# Patient Record
Sex: FEMALE | Race: WHITE | NOT HISPANIC OR LATINO | Employment: UNEMPLOYED | URBAN - NONMETROPOLITAN AREA
[De-identification: names, ages, dates, MRNs, and addresses within clinical notes are randomized per-mention and may not be internally consistent; named-entity substitution may affect disease eponyms.]

---

## 2018-05-10 ENCOUNTER — HISTORICAL (OUTPATIENT)
Dept: ADMINISTRATIVE | Facility: HOSPITAL | Age: 48
End: 2018-05-10

## 2018-06-20 LAB — CRC RECOMMENDATION EXT: NORMAL

## 2019-06-26 ENCOUNTER — HISTORICAL (OUTPATIENT)
Dept: ADMINISTRATIVE | Facility: HOSPITAL | Age: 49
End: 2019-06-26

## 2019-12-12 LAB — CRC RECOMMENDATION EXT: NORMAL

## 2020-02-13 LAB
INFLUENZA A ANTIGEN, POC: NEGATIVE
INFLUENZA B ANTIGEN, POC: NEGATIVE

## 2020-06-09 ENCOUNTER — HISTORICAL (OUTPATIENT)
Dept: ADMINISTRATIVE | Facility: HOSPITAL | Age: 50
End: 2020-06-09

## 2020-06-18 ENCOUNTER — HISTORICAL (OUTPATIENT)
Dept: SURGERY | Facility: HOSPITAL | Age: 50
End: 2020-06-18

## 2020-07-29 ENCOUNTER — HISTORICAL (OUTPATIENT)
Dept: ADMINISTRATIVE | Facility: HOSPITAL | Age: 50
End: 2020-07-29

## 2020-08-05 ENCOUNTER — HISTORICAL (OUTPATIENT)
Dept: ADMINISTRATIVE | Facility: HOSPITAL | Age: 50
End: 2020-08-05

## 2020-09-14 ENCOUNTER — HISTORICAL (OUTPATIENT)
Dept: ADMINISTRATIVE | Facility: HOSPITAL | Age: 50
End: 2020-09-14

## 2020-09-21 ENCOUNTER — HISTORICAL (OUTPATIENT)
Dept: ADMINISTRATIVE | Facility: HOSPITAL | Age: 50
End: 2020-09-21

## 2020-09-21 LAB
ALBUMIN SERPL-MCNC: 4.2 G/DL (ref 3.8–4.9)
ALBUMIN/GLOB SERPL: 1.8 {RATIO} (ref 1.2–2.2)
ALP SERPL-CCNC: 117 IU/L (ref 39–117)
ALT SERPL-CCNC: 20 IU/L (ref 0–32)
AST SERPL-CCNC: 18 IU/L (ref 0–40)
BILIRUB SERPL-MCNC: 0.2 MG/DL (ref 0–1.2)
BUN SERPL-MCNC: 14 MG/DL (ref 6–24)
CALCIUM SERPL-MCNC: 9.7 MG/DL (ref 8.7–10.2)
CHLORIDE SERPL-SCNC: 100 MMOL/L (ref 96–106)
CO2 SERPL-SCNC: 22 MMOL/L (ref 20–29)
CREAT SERPL-MCNC: 1.03 MG/DL (ref 0.57–1)
CREAT/UREA NIT SERPL: 14 (ref 9–23)
GLOBULIN SER-MCNC: 2.4 G/DL (ref 1.5–4.5)
GLUCOSE SERPL-MCNC: 107 MG/DL (ref 65–99)
HBA1C MFR BLD: 6 % (ref 4.8–5.6)
POTASSIUM SERPL-SCNC: 4.5 MMOL/L (ref 3.5–5.2)
PROT SERPL-MCNC: 6.6 G/DL (ref 6–8.5)
SODIUM SERPL-SCNC: 140 MMOL/L (ref 134–144)
TSH SERPL-ACNC: 2.37 MIU/ML (ref 0.45–4.5)

## 2021-06-23 LAB
ALBUMIN SERPL-MCNC: 4.2 G/DL (ref 3.4–5)
ALBUMIN/GLOB SERPL: 1.6 {RATIO}
ALP SERPL-CCNC: 73 U/L (ref 50–144)
ALT SERPL-CCNC: 34 U/L (ref 1–45)
ANION GAP SERPL CALC-SCNC: 8 MMOL/L (ref 7–16)
AST SERPL-CCNC: 34 U/L (ref 14–36)
BASOPHILS # BLD AUTO: 0.07 X10(3)/MCL (ref 0.01–0.08)
BASOPHILS NFR BLD AUTO: 1 % (ref 0.1–1.2)
BILIRUB SERPL-MCNC: 0.3 MG/DL (ref 0.1–1)
BUN SERPL-MCNC: 14 MG/DL (ref 7–20)
CALCIUM SERPL-MCNC: 9.6 MG/DL (ref 8.4–10.2)
CHLORIDE SERPL-SCNC: 105 MMOL/L (ref 94–110)
CHOLEST SERPL-MCNC: 186 MG/DL (ref 0–200)
CO2 SERPL-SCNC: 28 MMOL/L (ref 21–32)
CREAT SERPL-MCNC: 1.1 MG/DL (ref 0.52–1.04)
CREAT/UREA NIT SERPL: 12.7 (ref 12–20)
DEPRECATED CALCIDIOL+CALCIFEROL SERPL-MC: 57.7 NG/ML (ref 30–100)
EOSINOPHIL # BLD AUTO: 0.26 X10(3)/MCL (ref 0.04–0.36)
EOSINOPHIL NFR BLD AUTO: 3.6 % (ref 0.7–7)
ERYTHROCYTE [DISTWIDTH] IN BLOOD BY AUTOMATED COUNT: 12.8 % (ref 11–14.5)
EST. AVERAGE GLUCOSE BLD GHB EST-MCNC: 117 MG/DL (ref 70–115)
GLOBULIN SER-MCNC: 2.7 G/DL (ref 2–3.9)
GLUCOSE SERPL-MCNC: 109 MGM./DL (ref 70–115)
HBA1C MFR BLD: 5.9 % (ref 4–6)
HCT VFR BLD AUTO: 42.9 % (ref 36–48)
HDLC SERPL-MCNC: 38 MG/DL (ref 40–60)
HGB BLD-MCNC: 14.2 G/DL (ref 11.8–16)
IMM GRANULOCYTES # BLD AUTO: 0.04 X10E3/UL (ref 0–0.03)
IMM GRANULOCYTES NFR BLD AUTO: 0.6 % (ref 0–0.5)
LDLC SERPL CALC-MCNC: 117.6 MG/DL (ref 30–100)
LYMPHOCYTES # BLD AUTO: 2.5 X10(3)/MCL (ref 1.16–3.74)
LYMPHOCYTES NFR BLD AUTO: 34.9 % (ref 20–55)
MCH RBC QN AUTO: 29.2 PG (ref 27–34)
MCHC RBC AUTO-ENTMCNC: 33.1 G/DL (ref 31–37)
MCV RBC AUTO: 88.1 FL (ref 79–99)
MONOCYTES # BLD AUTO: 0.8 X10(3)/MCL (ref 0.24–0.36)
MONOCYTES NFR BLD AUTO: 11.2 % (ref 4.7–12.5)
NEUTROPHILS # BLD AUTO: 3.5 X10(3)/MCL (ref 1.56–6.13)
NEUTROPHILS NFR BLD AUTO: 48.7 % (ref 37–73)
PLATELET # BLD AUTO: 297 X10(3)/MCL (ref 140–371)
PMV BLD AUTO: 11.5 FL (ref 9.4–12.4)
POTASSIUM SERPL-SCNC: 4.3 MMOL/L (ref 3.5–5.1)
PROT SERPL-MCNC: 6.9 G/DL (ref 6.3–8.2)
RBC # BLD AUTO: 4.87 X10(6)/MCL (ref 4–5.1)
SODIUM SERPL-SCNC: 141 MMOL/L (ref 135–145)
TRIGL SERPL-MCNC: 150 MG/DL (ref 30–200)
TSH SERPL-ACNC: 1.07 UIU/ML (ref 0.36–3.74)
WBC # SPEC AUTO: 7.2 X10(3)/MCL (ref 4–11.5)

## 2021-07-06 ENCOUNTER — HISTORICAL (OUTPATIENT)
Dept: ADMINISTRATIVE | Facility: HOSPITAL | Age: 51
End: 2021-07-06

## 2021-07-08 ENCOUNTER — HISTORICAL (OUTPATIENT)
Dept: ADMINISTRATIVE | Facility: HOSPITAL | Age: 51
End: 2021-07-08

## 2021-07-08 LAB — SARS-COV-2 RNA RESP QL NAA+PROBE: NEGATIVE

## 2022-03-24 ENCOUNTER — HISTORICAL (OUTPATIENT)
Dept: ADMINISTRATIVE | Facility: HOSPITAL | Age: 52
End: 2022-03-24

## 2022-04-11 ENCOUNTER — HISTORICAL (OUTPATIENT)
Dept: ADMINISTRATIVE | Facility: HOSPITAL | Age: 52
End: 2022-04-11

## 2022-04-29 VITALS
BODY MASS INDEX: 32.35 KG/M2 | DIASTOLIC BLOOD PRESSURE: 72 MMHG | OXYGEN SATURATION: 98 % | HEIGHT: 63 IN | WEIGHT: 182.56 LBS | SYSTOLIC BLOOD PRESSURE: 126 MMHG

## 2022-05-08 ENCOUNTER — HISTORICAL (OUTPATIENT)
Dept: ADMINISTRATIVE | Facility: HOSPITAL | Age: 52
End: 2022-05-08

## 2022-09-22 ENCOUNTER — HISTORICAL (OUTPATIENT)
Dept: ADMINISTRATIVE | Facility: HOSPITAL | Age: 52
End: 2022-09-22

## 2023-01-25 ENCOUNTER — DOCUMENTATION ONLY (OUTPATIENT)
Dept: ADMINISTRATIVE | Facility: HOSPITAL | Age: 53
End: 2023-01-25
Payer: COMMERCIAL

## 2023-02-08 ENCOUNTER — OFFICE VISIT (OUTPATIENT)
Dept: FAMILY MEDICINE | Facility: CLINIC | Age: 53
End: 2023-02-08
Payer: COMMERCIAL

## 2023-02-08 VITALS
SYSTOLIC BLOOD PRESSURE: 128 MMHG | TEMPERATURE: 98 F | HEART RATE: 75 BPM | OXYGEN SATURATION: 98 % | DIASTOLIC BLOOD PRESSURE: 78 MMHG | WEIGHT: 170 LBS | HEIGHT: 62 IN | BODY MASS INDEX: 31.28 KG/M2

## 2023-02-08 DIAGNOSIS — F17.200 NEEDS SMOKING CESSATION EDUCATION: ICD-10-CM

## 2023-02-08 DIAGNOSIS — E78.2 MIXED HYPERLIPIDEMIA: ICD-10-CM

## 2023-02-08 DIAGNOSIS — F43.10 POSTTRAUMATIC STRESS DISORDER: ICD-10-CM

## 2023-02-08 DIAGNOSIS — R73.01 IMPAIRED FASTING GLUCOSE: ICD-10-CM

## 2023-02-08 DIAGNOSIS — J01.00 ACUTE NON-RECURRENT MAXILLARY SINUSITIS: Primary | ICD-10-CM

## 2023-02-08 DIAGNOSIS — F33.41 RECURRENT MAJOR DEPRESSIVE DISORDER, IN PARTIAL REMISSION: ICD-10-CM

## 2023-02-08 DIAGNOSIS — E03.9 ACQUIRED HYPOTHYROIDISM: ICD-10-CM

## 2023-02-08 DIAGNOSIS — K75.81 NONALCOHOLIC STEATOHEPATITIS (NASH): ICD-10-CM

## 2023-02-08 PROBLEM — J30.9 ALLERGIC RHINITIS: Status: ACTIVE | Noted: 2023-02-08

## 2023-02-08 PROBLEM — K58.9 IRRITABLE BOWEL SYNDROME: Status: ACTIVE | Noted: 2023-02-08

## 2023-02-08 PROBLEM — K21.9 GASTROESOPHAGEAL REFLUX DISEASE: Status: ACTIVE | Noted: 2023-02-08

## 2023-02-08 PROBLEM — J01.90 ACUTE SINUSITIS: Status: ACTIVE | Noted: 2023-02-08

## 2023-02-08 PROBLEM — M47.812 SPONDYLOSIS OF CERVICAL SPINE: Status: ACTIVE | Noted: 2023-02-08

## 2023-02-08 PROBLEM — F33.9 RECURRENT MAJOR DEPRESSIVE DISORDER: Status: ACTIVE | Noted: 2023-02-08

## 2023-02-08 PROBLEM — R91.1 LUNG NODULE: Status: ACTIVE | Noted: 2023-02-08

## 2023-02-08 PROCEDURE — 1160F PR REVIEW ALL MEDS BY PRESCRIBER/CLIN PHARMACIST DOCUMENTED: ICD-10-PCS | Mod: CPTII,,, | Performed by: FAMILY MEDICINE

## 2023-02-08 PROCEDURE — 96372 THER/PROPH/DIAG INJ SC/IM: CPT | Mod: ,,, | Performed by: FAMILY MEDICINE

## 2023-02-08 PROCEDURE — 1160F RVW MEDS BY RX/DR IN RCRD: CPT | Mod: CPTII,,, | Performed by: FAMILY MEDICINE

## 2023-02-08 PROCEDURE — 1159F PR MEDICATION LIST DOCUMENTED IN MEDICAL RECORD: ICD-10-PCS | Mod: CPTII,,, | Performed by: FAMILY MEDICINE

## 2023-02-08 PROCEDURE — 99214 OFFICE O/P EST MOD 30 MIN: CPT | Mod: 25,,, | Performed by: FAMILY MEDICINE

## 2023-02-08 PROCEDURE — 3074F SYST BP LT 130 MM HG: CPT | Mod: CPTII,,, | Performed by: FAMILY MEDICINE

## 2023-02-08 PROCEDURE — 1159F MED LIST DOCD IN RCRD: CPT | Mod: CPTII,,, | Performed by: FAMILY MEDICINE

## 2023-02-08 PROCEDURE — 3078F DIAST BP <80 MM HG: CPT | Mod: CPTII,,, | Performed by: FAMILY MEDICINE

## 2023-02-08 PROCEDURE — 3074F PR MOST RECENT SYSTOLIC BLOOD PRESSURE < 130 MM HG: ICD-10-PCS | Mod: CPTII,,, | Performed by: FAMILY MEDICINE

## 2023-02-08 PROCEDURE — 3008F BODY MASS INDEX DOCD: CPT | Mod: CPTII,,, | Performed by: FAMILY MEDICINE

## 2023-02-08 PROCEDURE — 3078F PR MOST RECENT DIASTOLIC BLOOD PRESSURE < 80 MM HG: ICD-10-PCS | Mod: CPTII,,, | Performed by: FAMILY MEDICINE

## 2023-02-08 PROCEDURE — 3008F PR BODY MASS INDEX (BMI) DOCUMENTED: ICD-10-PCS | Mod: CPTII,,, | Performed by: FAMILY MEDICINE

## 2023-02-08 PROCEDURE — 96372 PR INJECTION,THERAP/PROPH/DIAG2ST, IM OR SUBCUT: ICD-10-PCS | Mod: ,,, | Performed by: FAMILY MEDICINE

## 2023-02-08 PROCEDURE — 99214 PR OFFICE/OUTPT VISIT, EST, LEVL IV, 30-39 MIN: ICD-10-PCS | Mod: 25,,, | Performed by: FAMILY MEDICINE

## 2023-02-08 RX ORDER — FENOFIBRATE 160 MG/1
160 TABLET ORAL
COMMUNITY
Start: 2022-07-14 | End: 2023-03-21 | Stop reason: SDUPTHER

## 2023-02-08 RX ORDER — AZITHROMYCIN 250 MG/1
TABLET, FILM COATED ORAL
Qty: 6 TABLET | Refills: 0 | Status: SHIPPED | OUTPATIENT
Start: 2023-02-08 | End: 2023-02-13

## 2023-02-08 RX ORDER — GLUCOSAMINE HCL 500 MG
TABLET ORAL
COMMUNITY

## 2023-02-08 RX ORDER — ESCITALOPRAM OXALATE 10 MG/1
10 TABLET ORAL
COMMUNITY
Start: 2022-11-08 | End: 2023-03-21 | Stop reason: SDUPTHER

## 2023-02-08 RX ORDER — ONDANSETRON 4 MG/1
4 TABLET, ORALLY DISINTEGRATING ORAL EVERY 6 HOURS PRN
COMMUNITY
Start: 2022-08-18 | End: 2023-03-01

## 2023-02-08 RX ORDER — LEVOTHYROXINE SODIUM 88 UG/1
88 TABLET ORAL
COMMUNITY
Start: 2022-07-14 | End: 2023-03-21 | Stop reason: SDUPTHER

## 2023-02-08 RX ORDER — VILAZODONE HYDROCHLORIDE 20 MG/1
20 TABLET ORAL
COMMUNITY
Start: 2022-11-23 | End: 2023-03-21 | Stop reason: SDUPTHER

## 2023-02-08 RX ORDER — BETAMETHASONE SODIUM PHOSPHATE AND BETAMETHASONE ACETATE 3; 3 MG/ML; MG/ML
9 INJECTION, SUSPENSION INTRA-ARTICULAR; INTRALESIONAL; INTRAMUSCULAR; SOFT TISSUE
Status: COMPLETED | OUTPATIENT
Start: 2023-02-08 | End: 2023-02-08

## 2023-02-08 RX ADMIN — BETAMETHASONE SODIUM PHOSPHATE AND BETAMETHASONE ACETATE 9 MG: 3; 3 INJECTION, SUSPENSION INTRA-ARTICULAR; INTRALESIONAL; INTRAMUSCULAR; SOFT TISSUE at 05:02

## 2023-02-08 NOTE — PROGRESS NOTES
"SUBJECTIVE:  HPI    Marj Marcus is a 52 y.o. female here for Follow-up (Has sinus congestion).  She reports a 5 day history of nasal congestion, facial pain and pressure sore throat.  No chills.    She is been doing well from her depression standpoint.  She has to take escitalopram and Viibryd.  She denies any medication side effects.  Her social stressors have improved.    She denies any chest pain or shortness of breath.      Marj's allergies, medications, history, and problem list were updated as appropriate.    ROS:  Pertinent ROS as above, otherwise negative    OBJECTIVE:  Vital signs  Visit Vitals  /78 (BP Location: Right arm, Patient Position: Sitting, BP Method: Medium (Manual))   Pulse 75   Temp 97.5 °F (36.4 °C)   Ht 5' 2.4" (1.585 m)   Wt 77.1 kg (169 lb 15.6 oz)   SpO2 98%   BMI 30.69 kg/m²          PHYSICAL EXAM:  General:  Awake, alert, no acute distress   Eyes:  Pupils equal, round, reactive to light.  Conjunctiva normal bilaterally.  Ears:  Bilateral external auditory canals normal.  Bilateral tympanic membranes normal.  Nares:  Bilateral turbinate erythema and edema with clear rhinorrhea.  Maxillary sinus tenderness to palpation.  Oropharynx:  Postnasal drainage present.  No erythema or exudate.  Neck:  No lymphadenopathy  Cardiovascular: Regular rate and rhythm.  No murmurs.  Respiratory: Clear to auscultation bilaterally, normal effort      January 11, 2023:  Glucose 105, hemoglobin A1c 5.9, BUN 24, creatinine 1.20, GFR 53, TSH 1.87, AST 21, ALT 19, total cholesterol 162, triglycerides 52, HDL 48, , hemoglobin 14, hematocrit 42, white blood cell count 6.59, platelet count 297      ASSESSMENT/PLAN:  1. Acute non-recurrent maxillary sinusitis  Assessment & Plan:  Celestone 9 mg IM   Z-Dolores    Orders:  -     azithromycin (Z-DOLORES) 250 MG tablet; Take 2 tablets (500 mg total) by mouth once daily for 1 day, THEN 1 tablet (250 mg total) once daily for 4 days.  Dispense: 6 tablet; Refill: 0  - "     betamethasone acetate-betamethasone sodium phosphate injection 9 mg    2. Mixed hyperlipidemia  Assessment & Plan:  Stable on fenofibrate 160 mg a day    Orders:  -     Lipid Panel; Future; Expected date: 08/08/2023    3. Acquired hypothyroidism  Assessment & Plan:  Stable on levothyroxine 88 mcg a day    Orders:  -     CBC Auto Differential; Future; Expected date: 08/08/2023  -     TSH; Future; Expected date: 08/08/2023    4. Impaired fasting glucose  -     Hemoglobin A1C; Future; Expected date: 08/08/2023    5. Nonalcoholic steatohepatitis (GUTIÉRREZ)  -     Comprehensive Metabolic Panel; Future; Expected date: 08/08/2023    6. Recurrent major depressive disorder, in partial remission  Assessment & Plan:  Controlled on Viibryd 20 mg a day and escitalopram 10 mg a day      7. Posttraumatic stress disorder            Follow Up:  Follow up in about 6 months (around 8/8/2023) for Fasting labs, Chronic medical follow-up.

## 2023-03-01 ENCOUNTER — OFFICE VISIT (OUTPATIENT)
Dept: FAMILY MEDICINE | Facility: CLINIC | Age: 53
End: 2023-03-01
Payer: COMMERCIAL

## 2023-03-01 VITALS
TEMPERATURE: 100 F | WEIGHT: 169.38 LBS | BODY MASS INDEX: 30.01 KG/M2 | HEIGHT: 63 IN | HEART RATE: 86 BPM | DIASTOLIC BLOOD PRESSURE: 72 MMHG | SYSTOLIC BLOOD PRESSURE: 100 MMHG | OXYGEN SATURATION: 96 %

## 2023-03-01 DIAGNOSIS — J20.9 ACUTE BRONCHITIS, UNSPECIFIED ORGANISM: ICD-10-CM

## 2023-03-01 DIAGNOSIS — J01.91 ACUTE RECURRENT SINUSITIS, UNSPECIFIED LOCATION: Primary | ICD-10-CM

## 2023-03-01 PROCEDURE — 3008F BODY MASS INDEX DOCD: CPT | Mod: CPTII,,, | Performed by: FAMILY MEDICINE

## 2023-03-01 PROCEDURE — 3074F SYST BP LT 130 MM HG: CPT | Mod: CPTII,,, | Performed by: FAMILY MEDICINE

## 2023-03-01 PROCEDURE — 1160F PR REVIEW ALL MEDS BY PRESCRIBER/CLIN PHARMACIST DOCUMENTED: ICD-10-PCS | Mod: CPTII,,, | Performed by: FAMILY MEDICINE

## 2023-03-01 PROCEDURE — 1160F RVW MEDS BY RX/DR IN RCRD: CPT | Mod: CPTII,,, | Performed by: FAMILY MEDICINE

## 2023-03-01 PROCEDURE — 3008F PR BODY MASS INDEX (BMI) DOCUMENTED: ICD-10-PCS | Mod: CPTII,,, | Performed by: FAMILY MEDICINE

## 2023-03-01 PROCEDURE — 3078F DIAST BP <80 MM HG: CPT | Mod: CPTII,,, | Performed by: FAMILY MEDICINE

## 2023-03-01 PROCEDURE — 1159F MED LIST DOCD IN RCRD: CPT | Mod: CPTII,,, | Performed by: FAMILY MEDICINE

## 2023-03-01 PROCEDURE — 99214 PR OFFICE/OUTPT VISIT, EST, LEVL IV, 30-39 MIN: ICD-10-PCS | Mod: ,,, | Performed by: FAMILY MEDICINE

## 2023-03-01 PROCEDURE — 99214 OFFICE O/P EST MOD 30 MIN: CPT | Mod: ,,, | Performed by: FAMILY MEDICINE

## 2023-03-01 PROCEDURE — 3078F PR MOST RECENT DIASTOLIC BLOOD PRESSURE < 80 MM HG: ICD-10-PCS | Mod: CPTII,,, | Performed by: FAMILY MEDICINE

## 2023-03-01 PROCEDURE — 3074F PR MOST RECENT SYSTOLIC BLOOD PRESSURE < 130 MM HG: ICD-10-PCS | Mod: CPTII,,, | Performed by: FAMILY MEDICINE

## 2023-03-01 PROCEDURE — 1159F PR MEDICATION LIST DOCUMENTED IN MEDICAL RECORD: ICD-10-PCS | Mod: CPTII,,, | Performed by: FAMILY MEDICINE

## 2023-03-01 RX ORDER — CEFDINIR 300 MG/1
300 CAPSULE ORAL 2 TIMES DAILY
Qty: 20 CAPSULE | Refills: 0 | Status: SHIPPED | OUTPATIENT
Start: 2023-03-01 | End: 2023-03-11

## 2023-03-01 RX ORDER — ALBUTEROL SULFATE 90 UG/1
2 AEROSOL, METERED RESPIRATORY (INHALATION) EVERY 4 HOURS PRN
Qty: 18 G | Refills: 0 | Status: SHIPPED | OUTPATIENT
Start: 2023-03-01 | End: 2023-04-20 | Stop reason: ALTCHOICE

## 2023-03-01 RX ORDER — PREDNISONE 10 MG/1
40 TABLET ORAL DAILY
Qty: 28 TABLET | Refills: 0 | Status: SHIPPED | OUTPATIENT
Start: 2023-03-01 | End: 2023-03-08

## 2023-03-01 NOTE — PROGRESS NOTES
"SUBJECTIVE:  HPI    Marj Marcus is a 52 y.o. female here for Cough, Nasal Congestion, Fever, and Fatigue.  She reports a greater than 7 day history of nasal congestion, purulent rhinorrhea, increased cough and chest congestion associated with wheezing.  She has run fever for the last 3 days to 102°.  She was previously seen on February 8th and diagnosed with sinusitis and treated with azithromycin.  Her symptoms improved for only 4 days.  She does continue to smoke.      Marj's allergies, medications, history, and problem list were updated as appropriate.  Reviewed last office note dated February 8, 2023.    ROS:  Pertinent ROS as above, otherwise negative    OBJECTIVE:  Vital signs  Visit Vitals  /72 (BP Location: Left arm, Patient Position: Sitting, BP Method: Medium (Manual))   Pulse 86   Temp 99.6 °F (37.6 °C) (Oral)   Ht 5' 2.6" (1.59 m)   Wt 76.8 kg (169 lb 6.4 oz)   LMP  (LMP Unknown)   SpO2 96%   BMI 30.39 kg/m²          PHYSICAL EXAM:  General:  Awake, alert, no acute distress   Eyes:  Pupils equal, round, reactive to light.  Conjunctiva normal bilaterally.  Ears:  Bilateral external auditory canals normal.  Tympanic membrane normal on the right and erythematous on the left  Nares:  Bilateral turbinate erythema and edema with clear rhinorrhea.  Oropharynx:  Postnasal drainage present.  No erythema or exudate.  Cardiovascular: Regular rate and rhythm.  No murmurs.  Respiratory: Clear to auscultation bilaterally, normal effort    ASSESSMENT/PLAN:  1. Acute recurrent sinusitis, unspecified location  -     cefdinir (OMNICEF) 300 MG capsule; Take 1 capsule (300 mg total) by mouth 2 (two) times daily. for 10 days  Dispense: 20 capsule; Refill: 0    2. Acute bronchitis, unspecified organism  -     predniSONE (DELTASONE) 10 MG tablet; Take 4 tablets (40 mg total) by mouth once daily. for 7 days  Dispense: 28 tablet; Refill: 0  -     albuterol (VENTOLIN HFA) 90 mcg/actuation inhaler; Inhale 2 puffs into the " lungs every 4 (four) hours as needed for Wheezing (cough/wheezing/shortness of breath). Rescue  Dispense: 18 g; Refill: 0            Follow Up:  Follow up if symptoms worsen or fail to improve.

## 2023-03-10 ENCOUNTER — CLINICAL SUPPORT (OUTPATIENT)
Dept: SMOKING CESSATION | Facility: CLINIC | Age: 53
End: 2023-03-10
Payer: COMMERCIAL

## 2023-03-10 DIAGNOSIS — F17.200 NICOTINE DEPENDENCE: Primary | ICD-10-CM

## 2023-03-10 PROCEDURE — 99404 PR PREVENT COUNSEL,INDIV,60 MIN: ICD-10-PCS | Mod: S$GLB,,, | Performed by: GENERAL PRACTICE

## 2023-03-10 PROCEDURE — 99404 PREV MED CNSL INDIV APPRX 60: CPT | Mod: S$GLB,,, | Performed by: GENERAL PRACTICE

## 2023-03-10 RX ORDER — IBUPROFEN 200 MG
1 TABLET ORAL DAILY
Qty: 14 PATCH | Refills: 1 | Status: SHIPPED | OUTPATIENT
Start: 2023-03-10 | End: 2024-02-22 | Stop reason: ALTCHOICE

## 2023-03-10 RX ORDER — DM/P-EPHED/ACETAMINOPH/DOXYLAM 30-7.5/3
2 LIQUID (ML) ORAL
Qty: 300 LOZENGE | Refills: 0 | Status: SHIPPED | OUTPATIENT
Start: 2023-03-10 | End: 2024-02-22 | Stop reason: ALTCHOICE

## 2023-03-14 ENCOUNTER — TELEPHONE (OUTPATIENT)
Dept: SMOKING CESSATION | Facility: CLINIC | Age: 53
End: 2023-03-14
Payer: COMMERCIAL

## 2023-03-20 ENCOUNTER — TELEPHONE (OUTPATIENT)
Dept: SMOKING CESSATION | Facility: CLINIC | Age: 53
End: 2023-03-20
Payer: COMMERCIAL

## 2023-03-20 NOTE — TELEPHONE ENCOUNTER
Attempted to contact patient to return phone call after receiving a request to call patient back regarding prescribed NRTs.  Counselor left as voice message giving explanation why prescription has not been filled and instructions on how to complete process to get it filled and mailed out.

## 2023-03-21 ENCOUNTER — TELEPHONE (OUTPATIENT)
Dept: FAMILY MEDICINE | Facility: CLINIC | Age: 53
End: 2023-03-21
Payer: COMMERCIAL

## 2023-03-21 ENCOUNTER — CLINICAL SUPPORT (OUTPATIENT)
Dept: SMOKING CESSATION | Facility: CLINIC | Age: 53
End: 2023-03-21

## 2023-03-21 DIAGNOSIS — F43.10 POSTTRAUMATIC STRESS DISORDER: ICD-10-CM

## 2023-03-21 DIAGNOSIS — E03.9 ACQUIRED HYPOTHYROIDISM: ICD-10-CM

## 2023-03-21 DIAGNOSIS — K75.81 NONALCOHOLIC STEATOHEPATITIS (NASH): ICD-10-CM

## 2023-03-21 DIAGNOSIS — F17.200 NICOTINE DEPENDENCE: Primary | ICD-10-CM

## 2023-03-21 DIAGNOSIS — F33.41 RECURRENT MAJOR DEPRESSIVE DISORDER, IN PARTIAL REMISSION: Primary | ICD-10-CM

## 2023-03-21 PROCEDURE — 99402 PR PREVENT COUNSEL,INDIV,30 MIN: ICD-10-PCS | Mod: S$GLB,,, | Performed by: GENERAL PRACTICE

## 2023-03-21 PROCEDURE — 99402 PREV MED CNSL INDIV APPRX 30: CPT | Mod: S$GLB,,, | Performed by: GENERAL PRACTICE

## 2023-03-21 RX ORDER — ESCITALOPRAM OXALATE 10 MG/1
10 TABLET ORAL DAILY
Qty: 90 TABLET | Refills: 1 | Status: SHIPPED | OUTPATIENT
Start: 2023-03-21 | End: 2023-11-09 | Stop reason: SDUPTHER

## 2023-03-21 RX ORDER — VILAZODONE HYDROCHLORIDE 20 MG/1
20 TABLET ORAL DAILY
Qty: 90 TABLET | Refills: 1 | Status: SHIPPED | OUTPATIENT
Start: 2023-03-21 | End: 2024-02-22 | Stop reason: SDUPTHER

## 2023-03-21 RX ORDER — FENOFIBRATE 160 MG/1
160 TABLET ORAL DAILY
Qty: 90 TABLET | Refills: 1 | Status: SHIPPED | OUTPATIENT
Start: 2023-03-21 | End: 2023-11-20 | Stop reason: SDUPTHER

## 2023-03-21 RX ORDER — LEVOTHYROXINE SODIUM 88 UG/1
88 TABLET ORAL
Qty: 90 TABLET | Refills: 1 | Status: SHIPPED | OUTPATIENT
Start: 2023-03-21 | End: 2023-09-24 | Stop reason: SDUPTHER

## 2023-03-21 NOTE — PROGRESS NOTES
Individual Follow-Up Form    3/21/2023    Quit Date: TBD    Clinical Status of Patient: Outpatient    Length of Service: 30 minutes    Continuing Medication: yes  Patches    Other Medications: Nicotine Lozenges     Target Symptoms: Withdrawal and medication side effects. The following were  rated moderate (3) to severe (4) on TCRS:  Moderate (3): None Reported  Severe (4): None     Comments: Spoke with patient at length via phone regarding tobacco cessation progress. Patient will be receive her prescribed tobacco cessation medications 14mg nicotine patch and 2mg nicotine lozenges via mail from [facility] .  Patient remains smoking 5-6 cigarettes a day.   Patient will cut down to 4 cigarettes a day this week.  Reviewed strategies, cues, and triggers.  Patient stated that during her trip, she limited the number of cigarettes she smoked due to environmental restrictions against smoking.  Counselor suggested that patient utilize jolly ranchers, dum dum lollipops, and toffee candy to help control nicotine cravings.  Patient appeared to be receptive to the information given.  Counselor will continue to motivate patient to be tobacco free.    Diagnosis: F17.200    Next Visit: 1 week

## 2023-03-28 NOTE — TELEPHONE ENCOUNTER
"Subjective   Patient ID: Jerel Maddox is a 8 y.o. female who presents for URI (Patient claims beginning of march she has a lingering cough last night it got worse today she claims she had ear pain yesterday.tylenol makes it feel ok.).    HPI     Review of Systems    Objective   BP 90/60 (Patient Position: Sitting, BP Cuff Size: Child)   Pulse 101   Resp 16   Ht 1.372 m (4' 6\")   Wt 33 kg   SpO2 99%   BMI 17.55 kg/m²     Physical Exam    Assessment/Plan          " Attempted to contact patient to respond to a call back request from patient regarding her NRT prescription.  Patient did not answer.  Counselor left a voice message with explanation why prescription has not been processed and shipped out.  Counselor also left information regarding instructions on how to complete the process and the phone number to the pharmacy.

## 2023-04-20 ENCOUNTER — OFFICE VISIT (OUTPATIENT)
Dept: FAMILY MEDICINE | Facility: CLINIC | Age: 53
End: 2023-04-20
Payer: COMMERCIAL

## 2023-04-20 VITALS
TEMPERATURE: 98 F | DIASTOLIC BLOOD PRESSURE: 68 MMHG | HEART RATE: 80 BPM | OXYGEN SATURATION: 99 % | HEIGHT: 63 IN | SYSTOLIC BLOOD PRESSURE: 112 MMHG | BODY MASS INDEX: 30.12 KG/M2 | WEIGHT: 170 LBS

## 2023-04-20 DIAGNOSIS — E03.9 ACQUIRED HYPOTHYROIDISM: ICD-10-CM

## 2023-04-20 DIAGNOSIS — R00.2 PALPITATIONS: Primary | ICD-10-CM

## 2023-04-20 LAB — TSH SERPL-ACNC: 1.28 UIU/ML (ref 0.36–3.74)

## 2023-04-20 PROCEDURE — 3008F PR BODY MASS INDEX (BMI) DOCUMENTED: ICD-10-PCS | Mod: CPTII,,, | Performed by: FAMILY MEDICINE

## 2023-04-20 PROCEDURE — 1160F PR REVIEW ALL MEDS BY PRESCRIBER/CLIN PHARMACIST DOCUMENTED: ICD-10-PCS | Mod: CPTII,,, | Performed by: FAMILY MEDICINE

## 2023-04-20 PROCEDURE — 99214 OFFICE O/P EST MOD 30 MIN: CPT | Mod: ,,, | Performed by: FAMILY MEDICINE

## 2023-04-20 PROCEDURE — 3074F PR MOST RECENT SYSTOLIC BLOOD PRESSURE < 130 MM HG: ICD-10-PCS | Mod: CPTII,,, | Performed by: FAMILY MEDICINE

## 2023-04-20 PROCEDURE — 1159F PR MEDICATION LIST DOCUMENTED IN MEDICAL RECORD: ICD-10-PCS | Mod: CPTII,,, | Performed by: FAMILY MEDICINE

## 2023-04-20 PROCEDURE — 3074F SYST BP LT 130 MM HG: CPT | Mod: CPTII,,, | Performed by: FAMILY MEDICINE

## 2023-04-20 PROCEDURE — 3078F DIAST BP <80 MM HG: CPT | Mod: CPTII,,, | Performed by: FAMILY MEDICINE

## 2023-04-20 PROCEDURE — 99214 PR OFFICE/OUTPT VISIT, EST, LEVL IV, 30-39 MIN: ICD-10-PCS | Mod: ,,, | Performed by: FAMILY MEDICINE

## 2023-04-20 PROCEDURE — 3008F BODY MASS INDEX DOCD: CPT | Mod: CPTII,,, | Performed by: FAMILY MEDICINE

## 2023-04-20 PROCEDURE — 84443 ASSAY THYROID STIM HORMONE: CPT | Performed by: FAMILY MEDICINE

## 2023-04-20 PROCEDURE — 1160F RVW MEDS BY RX/DR IN RCRD: CPT | Mod: CPTII,,, | Performed by: FAMILY MEDICINE

## 2023-04-20 PROCEDURE — 3078F PR MOST RECENT DIASTOLIC BLOOD PRESSURE < 80 MM HG: ICD-10-PCS | Mod: CPTII,,, | Performed by: FAMILY MEDICINE

## 2023-04-20 PROCEDURE — 93000 PR ELECTROCARDIOGRAM, COMPLETE: ICD-10-PCS | Mod: ,,, | Performed by: FAMILY MEDICINE

## 2023-04-20 PROCEDURE — 1159F MED LIST DOCD IN RCRD: CPT | Mod: CPTII,,, | Performed by: FAMILY MEDICINE

## 2023-04-20 PROCEDURE — 93000 ELECTROCARDIOGRAM COMPLETE: CPT | Mod: ,,, | Performed by: FAMILY MEDICINE

## 2023-04-20 NOTE — PROGRESS NOTES
"SUBJECTIVE:  HPI    Marj Monroy is a 52 y.o. female here for Palpitations.  She presents with a several week history of progressively worsening, intermittent episodes of palpitations.  She is having daily episodes of palpitations that last anywhere from several minutes to several hours in duration.  Occasionally, these episodes awakened her from sleep and she occasionally has a coughing fit prior to the palpitations resolving.  Furthermore, she occasionally has shortness of breath and left-sided jaw pain and neck squeezing sensation while she is having palpitations.    Her last TSH was in July of 2022 and was 2.56.    She admits to cutting back on caffeine in the last several months and her symptoms have not really changed.    She does have a history of PVCs but this feels differently.    She had a normal heart catheterization in 2017 with normal coronary arteries and normal ejection fraction.    Family history is significant for her mother who has atrial fibrillation.    Benjis allergies, medications, history, and problem list were updated as appropriate.    ROS:  Pertinent ROS as above, otherwise negative    OBJECTIVE:  Vital signs  Visit Vitals  /68 (BP Location: Right arm)   Pulse 80   Temp 98.1 °F (36.7 °C) (Oral)   Ht 5' 2.6" (1.59 m)   Wt 77.1 kg (170 lb)   LMP  (LMP Unknown)   SpO2 99%   BMI 30.50 kg/m²          PHYSICAL EXAM:  General:  Awake, alert, no acute distress   Eyes:  Pupils equal, round, reactive to light.  Conjunctiva normal bilaterally.  Cardiovascular: Regular rate and rhythm.  No murmurs.  Respiratory: Clear to auscultation bilaterally, normal effort  Extremities:  No peripheral edema, no cyanosis  Skin: No rashes    EKG today:  Normal sinus rhythm, normal intervals and normal complexes.  Normal EKG.    ASSESSMENT/PLAN:  1. Palpitations  Assessment & Plan:  This may just be symptomatic PVCs.  However, there are some characteristics that are more worrisome for an arrhythmia.    Refer to " cardiology for Holter monitor and further evaluation.  Patient will call and let us know which cardiologist is on her insurance plan and we can send a referral.    Avoid caffeine    Recheck her TSH just to be sure that is also not a contributing factor    Orders:  -     POCT EKG 12-LEAD (NOT FOR [facility] USE); Future; Expected date: 04/20/2023    2. Acquired hypothyroidism  -     TSH; Future; Expected date: 04/20/2023

## 2023-04-20 NOTE — ASSESSMENT & PLAN NOTE
This may just be symptomatic PVCs.  However, there are some characteristics that are more worrisome for an arrhythmia.    Refer to cardiology for Holter monitor and further evaluation.  Patient will call and let us know which cardiologist is on her insurance plan and we can send a referral.    Avoid caffeine    Recheck her TSH just to be sure that is also not a contributing factor

## 2023-04-24 ENCOUNTER — PATIENT MESSAGE (OUTPATIENT)
Dept: FAMILY MEDICINE | Facility: CLINIC | Age: 53
End: 2023-04-24
Payer: COMMERCIAL

## 2023-04-24 ENCOUNTER — TELEPHONE (OUTPATIENT)
Dept: FAMILY MEDICINE | Facility: CLINIC | Age: 53
End: 2023-04-24
Payer: COMMERCIAL

## 2023-04-24 DIAGNOSIS — R00.2 PALPITATIONS: Primary | ICD-10-CM

## 2023-04-24 NOTE — TELEPHONE ENCOUNTER
----- Message from Jack Lindquist MD sent at 4/21/2023 11:50 AM CDT -----  Thyroid tests are normal.  Continue current dose.  Proceed with cardiology referral

## 2023-05-15 PROBLEM — J01.90 ACUTE SINUSITIS: Status: RESOLVED | Noted: 2023-02-08 | Resolved: 2023-05-15

## 2023-06-02 ENCOUNTER — TELEPHONE (OUTPATIENT)
Dept: FAMILY MEDICINE | Facility: CLINIC | Age: 53
End: 2023-06-02
Payer: COMMERCIAL

## 2023-06-02 NOTE — TELEPHONE ENCOUNTER
Nothing to call out at this time - take OTC medicine and drink lots of water and call back if the symptoms are worsening after one week

## 2023-06-02 NOTE — TELEPHONE ENCOUNTER
Pt called stating that she has been having a cough, congestion and sinus pressure since Wednesday and would like to see if something can be called out to the [facility] in [place].

## 2023-06-04 ENCOUNTER — PATIENT MESSAGE (OUTPATIENT)
Dept: FAMILY MEDICINE | Facility: CLINIC | Age: 53
End: 2023-06-04
Payer: COMMERCIAL

## 2023-06-05 ENCOUNTER — TELEPHONE (OUTPATIENT)
Dept: FAMILY MEDICINE | Facility: CLINIC | Age: 53
End: 2023-06-05
Payer: COMMERCIAL

## 2023-06-05 DIAGNOSIS — J01.90 ACUTE NON-RECURRENT SINUSITIS, UNSPECIFIED LOCATION: Primary | ICD-10-CM

## 2023-06-05 RX ORDER — AZITHROMYCIN 250 MG/1
TABLET, FILM COATED ORAL
Qty: 6 TABLET | Refills: 0 | Status: SHIPPED | OUTPATIENT
Start: 2023-06-05 | End: 2023-06-10

## 2023-06-05 NOTE — TELEPHONE ENCOUNTER
Pt states that she gets this sinus stuff  all the time family members are sick, can she have a zpack sent in that's all that help

## 2023-06-13 ENCOUNTER — CLINICAL SUPPORT (OUTPATIENT)
Dept: SMOKING CESSATION | Facility: CLINIC | Age: 53
End: 2023-06-13

## 2023-06-13 DIAGNOSIS — F17.200 NICOTINE DEPENDENCE: Primary | ICD-10-CM

## 2023-06-13 PROCEDURE — 99999 PR PBB SHADOW E&M-EST. PATIENT-LVL I: ICD-10-PCS | Mod: PBBFAC,,,

## 2023-06-13 PROCEDURE — 99999 PR PBB SHADOW E&M-EST. PATIENT-LVL I: CPT | Mod: PBBFAC,,,

## 2023-06-13 PROCEDURE — 99407 PR TOBACCO USE CESSATION INTENSIVE >10 MINUTES: ICD-10-PCS | Mod: S$GLB,,,

## 2023-06-13 PROCEDURE — 99407 BEHAV CHNG SMOKING > 10 MIN: CPT | Mod: S$GLB,,,

## 2023-06-13 NOTE — PROGRESS NOTES
Spoke with patient today in regard to smoking cessation progress for 3 month telephone follow up, she states not tobacco free. Patient states no interest in returning to the program at this time. Informed patient of benefit period, future follow ups, and contact information if any further help or support is needed. Will complete smart form for 3 month follow on Quit attempt #1.

## 2023-09-18 ENCOUNTER — CLINICAL SUPPORT (OUTPATIENT)
Dept: SMOKING CESSATION | Facility: CLINIC | Age: 53
End: 2023-09-18
Payer: COMMERCIAL

## 2023-09-18 DIAGNOSIS — F17.200 NICOTINE DEPENDENCE: Primary | ICD-10-CM

## 2023-09-18 PROCEDURE — 99999 PR PBB SHADOW E&M-EST. PATIENT-LVL I: CPT | Mod: PBBFAC,,,

## 2023-09-18 PROCEDURE — 99407 PR TOBACCO USE CESSATION INTENSIVE >10 MINUTES: ICD-10-PCS | Mod: S$GLB,,,

## 2023-09-18 PROCEDURE — 99407 BEHAV CHNG SMOKING > 10 MIN: CPT | Mod: S$GLB,,,

## 2023-09-18 PROCEDURE — 99999 PR PBB SHADOW E&M-EST. PATIENT-LVL I: ICD-10-PCS | Mod: PBBFAC,,,

## 2023-09-24 DIAGNOSIS — E03.9 ACQUIRED HYPOTHYROIDISM: ICD-10-CM

## 2023-09-25 RX ORDER — LEVOTHYROXINE SODIUM 88 UG/1
88 TABLET ORAL
Qty: 90 TABLET | Refills: 1 | Status: SHIPPED | OUTPATIENT
Start: 2023-09-25 | End: 2024-02-21

## 2023-10-19 ENCOUNTER — PATIENT OUTREACH (OUTPATIENT)
Dept: ADMINISTRATIVE | Facility: HOSPITAL | Age: 53
End: 2023-10-19
Payer: COMMERCIAL

## 2023-10-19 NOTE — PROGRESS NOTES
Population Health Chart Review & Patient Outreach Details:     Reason for Outreach Encounter:     []  Non-Compliant Report   [x]  Payor Report ([employer], PHN, BCBS, MSSP, MCIP, C, etc.)   []  Pre-Visit Chart Review     Patient Outreach Method:    [x]  Telephone Outreach Completed   [x] Successful   [] Left Voicemail   [] Unable to Contact (wrong number, no voicemail)  []  [facility] Outreach Sent  []  Letter Outreach Mailed  []  Fax Sent for External Records  []  External Records Upload    Health Maintenance Topics Addressed and Outreach Outcomes / Actions Taken:        [x]      Breast Cancer Screening [x]  Pt will complete on her own     []  External Records Requested     []  Added Reminder to Complete to Upcoming Primary Care Appt Notes     []  Patient Declined     []  Patient Will Call Back to Schedule     []  Patient Will Schedule with External Provider / Order Routed if Applicable     Additional Care Coordinator Notes:     Spoke with patient, she stated that she works in [employer] and her boss gave her an order for mammogram. She will have results sent to us once complete.    Keyanna Balderas MA - Panel Care Coordinator  [phone].

## 2023-10-26 ENCOUNTER — TELEPHONE (OUTPATIENT)
Dept: FAMILY MEDICINE | Facility: CLINIC | Age: 53
End: 2023-10-26
Payer: COMMERCIAL

## 2023-11-09 ENCOUNTER — TELEPHONE (OUTPATIENT)
Dept: FAMILY MEDICINE | Facility: CLINIC | Age: 53
End: 2023-11-09
Payer: COMMERCIAL

## 2023-11-09 DIAGNOSIS — F43.10 POSTTRAUMATIC STRESS DISORDER: ICD-10-CM

## 2023-11-09 RX ORDER — ESCITALOPRAM OXALATE 10 MG/1
10 TABLET ORAL DAILY
Qty: 90 TABLET | Refills: 1 | Status: SHIPPED | OUTPATIENT
Start: 2023-11-09 | End: 2024-02-22

## 2023-11-09 NOTE — TELEPHONE ENCOUNTER
Fenofibrate 160mg, QD #90           [facility]         Pt is scheduled.

## 2023-11-20 ENCOUNTER — TELEPHONE (OUTPATIENT)
Dept: FAMILY MEDICINE | Facility: CLINIC | Age: 53
End: 2023-11-20
Payer: COMMERCIAL

## 2023-11-20 DIAGNOSIS — K75.81 NONALCOHOLIC STEATOHEPATITIS (NASH): ICD-10-CM

## 2023-11-20 RX ORDER — FENOFIBRATE 160 MG/1
160 TABLET ORAL DAILY
Qty: 90 TABLET | Refills: 1 | Status: SHIPPED | OUTPATIENT
Start: 2023-11-20 | End: 2024-02-22 | Stop reason: SDUPTHER

## 2024-02-21 ENCOUNTER — PATIENT MESSAGE (OUTPATIENT)
Dept: FAMILY MEDICINE | Facility: CLINIC | Age: 54
End: 2024-02-21

## 2024-02-21 ENCOUNTER — OFFICE VISIT (OUTPATIENT)
Dept: FAMILY MEDICINE | Facility: CLINIC | Age: 54
End: 2024-02-21

## 2024-02-21 VITALS
BODY MASS INDEX: 31.89 KG/M2 | HEART RATE: 85 BPM | TEMPERATURE: 99 F | WEIGHT: 180 LBS | SYSTOLIC BLOOD PRESSURE: 108 MMHG | OXYGEN SATURATION: 98 % | DIASTOLIC BLOOD PRESSURE: 78 MMHG | HEIGHT: 63 IN

## 2024-02-21 DIAGNOSIS — F33.41 RECURRENT MAJOR DEPRESSIVE DISORDER, IN PARTIAL REMISSION: ICD-10-CM

## 2024-02-21 DIAGNOSIS — J06.9 VIRAL UPPER RESPIRATORY TRACT INFECTION: Primary | ICD-10-CM

## 2024-02-21 PROCEDURE — 99213 OFFICE O/P EST LOW 20 MIN: CPT | Mod: ,,, | Performed by: FAMILY MEDICINE

## 2024-02-21 RX ORDER — LEVOTHYROXINE SODIUM 100 UG/1
100 TABLET ORAL EVERY MORNING
COMMUNITY
Start: 2023-12-14 | End: 2024-02-22 | Stop reason: SDUPTHER

## 2024-02-21 RX ORDER — LIOTHYRONINE SODIUM 5 UG/1
10 TABLET ORAL DAILY PRN
COMMUNITY
Start: 2023-12-14 | End: 2024-02-22 | Stop reason: SDUPTHER

## 2024-02-21 RX ORDER — DEXBROMPHENIRAMINE MALEATE, PHENYLEPHRINE HYDROCHLORIDE 2; 7.5 MG/1; MG/1
1 TABLET ORAL
COMMUNITY
Start: 2023-08-30

## 2024-02-21 RX ORDER — ARIPIPRAZOLE 2 MG/1
2 TABLET ORAL DAILY
Qty: 30 TABLET | Refills: 11 | Status: SHIPPED | OUTPATIENT
Start: 2024-02-21 | End: 2024-03-13 | Stop reason: SDUPTHER

## 2024-02-21 NOTE — ASSESSMENT & PLAN NOTE
Suspect COVID-19 infection.  Patient declines testing     Over-the-counter treatment with Motrin, Tylenol, over-the-counter cold and cough medications

## 2024-02-21 NOTE — PROGRESS NOTES
"SUBJECTIVE:  HPI    Marj Monroy is a 53 y.o. female here for Sore Throat and Otalgia.     She reports a 4 day history of sore throat, otalgia, cough, nasal congestion.  Her symptoms began about 3 days after exposure to someone with known COVID-19 infection.    She also complains of severely depressed mood associated with difficulty focusing and concentrating, increased sleep.  She has feelings of hopelessness and helplessness.      Marj's allergies, medications, history, and problem list were updated as appropriate.    ROS:  Pertinent ROS as above, otherwise negative    OBJECTIVE:  Vital signs  Visit Vitals  /78 (BP Location: Left arm)   Pulse 85   Temp 98.6 °F (37 °C) (Temporal)   Ht 5' 2.6" (1.59 m)   Wt 81.6 kg (180 lb)   LMP  (LMP Unknown)   SpO2 98%   BMI 32.30 kg/m²          PHYSICAL EXAM:  General:  Awake, alert, no acute distress   Eyes:  Pupils equal, round, reactive to light.  Conjunctiva normal bilaterally.  Ears:  Bilateral external auditory canals normal.  Bilateral tympanic membranes normal.  Nares:  Bilateral turbinate erythema and edema with clear rhinorrhea.  Oropharynx:  Postnasal drainage present.  No erythema or exudate.  Neck:  No lymphadenopathy  Cardiovascular: Regular rate and rhythm.  No murmurs.  Respiratory: Clear to auscultation bilaterally, normal effort  Skin: No rashes  Psychiatric: Tearful.  Poor eye contact.  No suicidal ideation.  Depressed mood.  Blunted affect.  Good judgment and insight.    ASSESSMENT/PLAN:  1. Viral upper respiratory tract infection  Assessment & Plan:  Suspect COVID-19 infection.  Patient declines testing     Over-the-counter treatment with Motrin, Tylenol, over-the-counter cold and cough medications      2. Recurrent major depressive disorder, in partial remission  Assessment & Plan:  Continue Lexapro 10 mg daily and Viibryd 20 mg daily     Consider discontinuing Lexapro at a later date.    Add Abilify 2 mg at night    Return to clinic in 3 " weeks    Orders:  -     ARIPiprazole (ABILIFY) 2 MG Tab; Take 1 tablet (2 mg total) by mouth once daily.  Dispense: 30 tablet; Refill: 11      Follow Up:  Follow up in about 3 weeks (around 3/13/2024) for follow-up acute problem.

## 2024-02-21 NOTE — ASSESSMENT & PLAN NOTE
Continue Lexapro 10 mg daily and Viibryd 20 mg daily     Consider discontinuing Lexapro at a later date.    Add Abilify 2 mg at night    Return to clinic in 3 weeks

## 2024-02-22 DIAGNOSIS — E03.9 ACQUIRED HYPOTHYROIDISM: Primary | ICD-10-CM

## 2024-02-22 DIAGNOSIS — K75.81 NONALCOHOLIC STEATOHEPATITIS (NASH): ICD-10-CM

## 2024-02-22 DIAGNOSIS — E78.2 MIXED HYPERLIPIDEMIA: ICD-10-CM

## 2024-02-22 DIAGNOSIS — F33.41 RECURRENT MAJOR DEPRESSIVE DISORDER, IN PARTIAL REMISSION: ICD-10-CM

## 2024-02-22 RX ORDER — FENOFIBRATE 160 MG/1
160 TABLET ORAL DAILY
Qty: 90 TABLET | Refills: 1 | Status: SHIPPED | OUTPATIENT
Start: 2024-02-22

## 2024-02-22 RX ORDER — LEVOTHYROXINE SODIUM 100 UG/1
100 TABLET ORAL EVERY MORNING
Qty: 30 TABLET | Refills: 11 | Status: SHIPPED | OUTPATIENT
Start: 2024-02-22 | End: 2025-02-21

## 2024-02-22 RX ORDER — LIOTHYRONINE SODIUM 5 UG/1
10 TABLET ORAL DAILY
Qty: 60 TABLET | Refills: 11 | Status: SHIPPED | OUTPATIENT
Start: 2024-02-22 | End: 2025-02-21

## 2024-02-22 RX ORDER — VILAZODONE HYDROCHLORIDE 20 MG/1
20 TABLET ORAL DAILY
Qty: 90 TABLET | Refills: 1 | Status: SHIPPED | OUTPATIENT
Start: 2024-02-22 | End: 2024-04-16

## 2024-02-22 NOTE — TELEPHONE ENCOUNTER
I reviewed the [facility] testing.  I would like for her to discontinue Lexapro and start Abilify.  The Abilify results on her test is actually consistent with a slow metabolizer of the medications so it just may require lower doses the patient of this through the portal.  I have also sent out refills on her medications.

## 2024-02-27 NOTE — TELEPHONE ENCOUNTER
Marj,     Willianuse was faxed to the number given. Please let the office know if it wasn't received.         Cristofer

## 2024-03-06 ENCOUNTER — CLINICAL SUPPORT (OUTPATIENT)
Dept: SMOKING CESSATION | Facility: CLINIC | Age: 54
End: 2024-03-06

## 2024-03-06 DIAGNOSIS — F17.200 NICOTINE DEPENDENCE: Primary | ICD-10-CM

## 2024-03-06 PROCEDURE — 99999 PR PBB SHADOW E&M-EST. PATIENT-LVL I: CPT | Mod: PBBFAC,,,

## 2024-03-06 NOTE — PROGRESS NOTES
Spoke with patient today in regard to smoking cessation progress 12 month telephone follow up, she states that she is not tobacco free.  Patient states she is not interested in the program..  Informed patient of benefit period, future follow up, and contact information if any further help or support is needed.  Will complete smart form for 12 month follow up on Quit attempt #1 and resolve episode.

## 2024-03-13 ENCOUNTER — OFFICE VISIT (OUTPATIENT)
Dept: FAMILY MEDICINE | Facility: CLINIC | Age: 54
End: 2024-03-13
Payer: COMMERCIAL

## 2024-03-13 VITALS
BODY MASS INDEX: 32.51 KG/M2 | SYSTOLIC BLOOD PRESSURE: 124 MMHG | TEMPERATURE: 97 F | WEIGHT: 181.19 LBS | HEART RATE: 104 BPM | OXYGEN SATURATION: 99 % | DIASTOLIC BLOOD PRESSURE: 66 MMHG

## 2024-03-13 DIAGNOSIS — F43.10 POSTTRAUMATIC STRESS DISORDER: ICD-10-CM

## 2024-03-13 DIAGNOSIS — E78.2 MIXED HYPERLIPIDEMIA: ICD-10-CM

## 2024-03-13 DIAGNOSIS — F33.41 RECURRENT MAJOR DEPRESSIVE DISORDER, IN PARTIAL REMISSION: Primary | ICD-10-CM

## 2024-03-13 DIAGNOSIS — E03.9 ACQUIRED HYPOTHYROIDISM: ICD-10-CM

## 2024-03-13 DIAGNOSIS — R10.13 EPIGASTRIC PAIN: ICD-10-CM

## 2024-03-13 DIAGNOSIS — R73.01 IMPAIRED FASTING GLUCOSE: ICD-10-CM

## 2024-03-13 PROBLEM — J06.9 VIRAL UPPER RESPIRATORY TRACT INFECTION: Status: RESOLVED | Noted: 2024-02-21 | Resolved: 2024-03-13

## 2024-03-13 PROCEDURE — 1160F RVW MEDS BY RX/DR IN RCRD: CPT | Mod: CPTII,,, | Performed by: FAMILY MEDICINE

## 2024-03-13 PROCEDURE — 99214 OFFICE O/P EST MOD 30 MIN: CPT | Mod: ,,, | Performed by: FAMILY MEDICINE

## 2024-03-13 PROCEDURE — 3074F SYST BP LT 130 MM HG: CPT | Mod: CPTII,,, | Performed by: FAMILY MEDICINE

## 2024-03-13 PROCEDURE — 1159F MED LIST DOCD IN RCRD: CPT | Mod: CPTII,,, | Performed by: FAMILY MEDICINE

## 2024-03-13 PROCEDURE — 3008F BODY MASS INDEX DOCD: CPT | Mod: CPTII,,, | Performed by: FAMILY MEDICINE

## 2024-03-13 PROCEDURE — 3078F DIAST BP <80 MM HG: CPT | Mod: CPTII,,, | Performed by: FAMILY MEDICINE

## 2024-03-13 RX ORDER — LANOLIN ALCOHOL/MO/W.PET/CERES
400 CREAM (GRAM) TOPICAL DAILY
COMMUNITY

## 2024-03-13 RX ORDER — ARIPIPRAZOLE 5 MG/1
5 TABLET ORAL DAILY
Qty: 30 TABLET | Refills: 11 | Status: SHIPPED | OUTPATIENT
Start: 2024-03-13 | End: 2024-04-16 | Stop reason: SINTOL

## 2024-03-13 RX ORDER — METFORMIN HYDROCHLORIDE 500 MG/1
500 TABLET, EXTENDED RELEASE ORAL
Qty: 90 TABLET | Refills: 3 | Status: SHIPPED | OUTPATIENT
Start: 2024-03-13 | End: 2024-06-10

## 2024-03-13 NOTE — PROGRESS NOTES
SUBJECTIVE:  HPI    Marj Monroy is a 53 y.o. female here for 3WK Follow up-meds for depression and PTSD.  Note from February 21, 2024 reviewed.    She reports dramatic improvement in her depressive symptoms since starting Abilify.  She does complain of some weight gain.  However, she does report that she awakens in the middle of the night usually around 2:00 a.m. and is having difficulty falling back asleep.  She goes to sleep just fine with the Abilify 2 mg at night.  Her mood is definitely, improved her concentration is improved, her engagement and sense of self-worth has improved.  She says she feels much better.      She also complains of some epigastric discomfort and dyspepsia and heartburn that is consistent with her prior episode of peptic ulcer disease and H pylori infection.  Her symptoms have not been relieved with omeprazole.    Marj's allergies, medications, history, and problem list were updated as appropriate.    ROS:  Pertinent ROS as above, otherwise negative    OBJECTIVE:  Vital signs  Visit Vitals  /66 (BP Location: Right arm, Patient Position: Sitting)   Pulse 104   Temp 96.5 °F (35.8 °C) (Oral)   Wt 82.2 kg (181 lb 3.2 oz)   LMP  (LMP Unknown)   SpO2 99%   BMI 32.51 kg/m²          PHYSICAL EXAM:  General: Awake, alert, no acute distress  Psychiatric: Mood and affect are definitely brighter.  She is tearful.  Her eye contact is good.  No depressed mood.  No suicidal ideation      ASSESSMENT/PLAN:  1. Recurrent major depressive disorder, in partial remission  Assessment & Plan:  Increase Abilify to 5 mg at night continue Viibryd 20 mg daily    Orders:  -     ARIPiprazole (ABILIFY) 5 MG Tab; Take 1 tablet (5 mg total) by mouth once daily.  Dispense: 30 tablet; Refill: 11    2. Posttraumatic stress disorder  Assessment & Plan:  Abilify and Viibryd      3. Epigastric pain  Assessment & Plan:  Check stool for H pylori and call with results    Orders:  -     Helicobacter Pylori Antigen Fecal  EIA; Future; Expected date: 03/13/2024    4. Impaired fasting glucose  Assessment & Plan:  Start metformin  mg daily and check hemoglobin A1c on return to clinic    Orders:  -     Comprehensive Metabolic Panel; Future; Expected date: 04/13/2024  -     CBC Auto Differential; Future; Expected date: 04/13/2024  -     Hemoglobin A1C; Future; Expected date: 04/13/2024  -     metFORMIN (GLUCOPHAGE-XR) 500 MG ER 24hr tablet; Take 1 tablet (500 mg total) by mouth daily with breakfast.  Dispense: 90 tablet; Refill: 3    5. Acquired hypothyroidism  Assessment & Plan:  Recheck TSH prior to return to clinic    Orders:  -     Comprehensive Metabolic Panel; Future; Expected date: 04/13/2024  -     CBC Auto Differential; Future; Expected date: 04/13/2024  -     TSH; Future; Expected date: 04/13/2024    6. Mixed hyperlipidemia  Assessment & Plan:  Recheck lipids prior to return to clinic    Orders:  -     Lipid Panel; Future; Expected date: 04/13/2024      Follow Up:  Follow up in about 4 weeks (around 4/10/2024) for chronic health conditions, Fasting labs.

## 2024-03-18 ENCOUNTER — TELEPHONE (OUTPATIENT)
Dept: FAMILY MEDICINE | Facility: CLINIC | Age: 54
End: 2024-03-18
Payer: COMMERCIAL

## 2024-03-18 DIAGNOSIS — R10.13 EPIGASTRIC PAIN: Primary | ICD-10-CM

## 2024-03-18 LAB — H. PYLORI STOOL: NEGATIVE

## 2024-03-18 PROCEDURE — 87338 HPYLORI STOOL AG IA: CPT | Performed by: FAMILY MEDICINE

## 2024-03-18 RX ORDER — PANTOPRAZOLE SODIUM 40 MG/1
40 TABLET, DELAYED RELEASE ORAL DAILY
Qty: 90 TABLET | Refills: 3 | Status: SHIPPED | OUTPATIENT
Start: 2024-03-18 | End: 2024-06-10

## 2024-03-18 NOTE — TELEPHONE ENCOUNTER
----- Message from Jack Lindquist MD sent at 3/18/2024  4:56 PM CDT -----  Stool for H pylori was negative.  I think she should take an antacid daily to see if her symptoms improve.  We can either send in a prescription for pantoprazole 40 mg daily or she can use over-the-counter omeprazole 2 tablets or capsules daily for 30 days

## 2024-04-16 ENCOUNTER — TELEPHONE (OUTPATIENT)
Dept: FAMILY MEDICINE | Facility: CLINIC | Age: 54
End: 2024-04-16

## 2024-04-16 ENCOUNTER — OFFICE VISIT (OUTPATIENT)
Dept: FAMILY MEDICINE | Facility: CLINIC | Age: 54
End: 2024-04-16
Payer: COMMERCIAL

## 2024-04-16 VITALS
OXYGEN SATURATION: 97 % | DIASTOLIC BLOOD PRESSURE: 80 MMHG | HEIGHT: 63 IN | WEIGHT: 182 LBS | TEMPERATURE: 97 F | SYSTOLIC BLOOD PRESSURE: 122 MMHG | HEART RATE: 91 BPM | BODY MASS INDEX: 32.25 KG/M2

## 2024-04-16 DIAGNOSIS — E78.2 MIXED HYPERLIPIDEMIA: ICD-10-CM

## 2024-04-16 DIAGNOSIS — R73.01 IMPAIRED FASTING GLUCOSE: ICD-10-CM

## 2024-04-16 DIAGNOSIS — E03.9 ACQUIRED HYPOTHYROIDISM: ICD-10-CM

## 2024-04-16 DIAGNOSIS — F33.41 RECURRENT MAJOR DEPRESSIVE DISORDER, IN PARTIAL REMISSION: Primary | ICD-10-CM

## 2024-04-16 DIAGNOSIS — K21.9 GASTROESOPHAGEAL REFLUX DISEASE WITHOUT ESOPHAGITIS: ICD-10-CM

## 2024-04-16 DIAGNOSIS — K75.81 NONALCOHOLIC STEATOHEPATITIS (NASH): ICD-10-CM

## 2024-04-16 PROCEDURE — 3079F DIAST BP 80-89 MM HG: CPT | Mod: CPTII,,, | Performed by: FAMILY MEDICINE

## 2024-04-16 PROCEDURE — 3044F HG A1C LEVEL LT 7.0%: CPT | Mod: CPTII,,, | Performed by: FAMILY MEDICINE

## 2024-04-16 PROCEDURE — 3074F SYST BP LT 130 MM HG: CPT | Mod: CPTII,,, | Performed by: FAMILY MEDICINE

## 2024-04-16 PROCEDURE — 3008F BODY MASS INDEX DOCD: CPT | Mod: CPTII,,, | Performed by: FAMILY MEDICINE

## 2024-04-16 PROCEDURE — 1160F RVW MEDS BY RX/DR IN RCRD: CPT | Mod: CPTII,,, | Performed by: FAMILY MEDICINE

## 2024-04-16 PROCEDURE — 1159F MED LIST DOCD IN RCRD: CPT | Mod: CPTII,,, | Performed by: FAMILY MEDICINE

## 2024-04-16 PROCEDURE — 99214 OFFICE O/P EST MOD 30 MIN: CPT | Mod: ,,, | Performed by: FAMILY MEDICINE

## 2024-04-16 RX ORDER — LEVOMILNACIPRAN HYDROCHLORIDE 20 MG/1
CAPSULE, EXTENDED RELEASE ORAL
Qty: 60 CAPSULE | Refills: 5 | Status: SHIPPED | OUTPATIENT
Start: 2024-04-16 | End: 2024-05-16

## 2024-04-16 NOTE — PROGRESS NOTES
"SUBJECTIVE:  HPI    Marj Monroy is a 53 y.o. female here for Follow-up (Labs) on chronic health conditions outlined in the assessment and plan below.      She does report some noticeable and significant weight gain since starting Abilify.  She also has some difficulty focusing her vision since increasing the dosage of Abilify.  Otherwise, her depression has been better.  She does continue to struggle with some mid evening insomnia around 1:00 a.m..  She has adjusted the timing of her dosing of medications and continues to have difficulty waking up at night and getting back to sleep.    Otherwise, she is without any complaints at this time.    Benjis allergies, medications, history, and problem list were updated as appropriate.    ROS:  Pertinent ROS as above, otherwise negative    OBJECTIVE:  Vital signs  Visit Vitals  /80 (BP Location: Right arm, Patient Position: Sitting)   Pulse 91   Temp 97.4 °F (36.3 °C) (Oral)   Ht 5' 2.6" (1.59 m)   Wt 82.6 kg (182 lb)   LMP  (LMP Unknown)   SpO2 97%   BMI 32.66 kg/m²          PHYSICAL EXAM:  General: Awake, alert, no acute distress, weight noted  Psychiatric: Mood and affect are congruent and improved.  Judgment and insight are intact.    Chemistry:  Lab Results   Component Value Date     04/05/2024    K 4.1 04/05/2024    BUN 14 04/05/2024    CREATININE 1.22 (H) 04/05/2024    EGFRNORACEVR 52 (L) 04/05/2024    GLUCOSE 109 06/23/2021    CALCIUM 9.5 04/05/2024    ALKPHOS 73 06/23/2021    AST 22 04/05/2024    ALT 21 04/05/2024    HBSTXKRW86DI 57.70 06/23/2021    TSH 0.577 04/05/2024        Lab Results   Component Value Date    HGBA1C 6.0 (H) 04/05/2024        Hematology:  Lab Results   Component Value Date    WBC 10.6 04/05/2024    HGB 14.2 04/05/2024    MCV 91 04/05/2024     04/05/2024       Lipid Panel:  Lab Results   Component Value Date    CHOL 172 04/05/2024    HDL 54 04/05/2024    TRIG 101 04/05/2024        ASSESSMENT/PLAN:  1. Recurrent major " "depressive disorder, in partial remission  Overview:  Tried and failed the following drugs in the past:  Celexa, Lexapro, Zoloft, Trintellix, Cymbalta, Paxil    Assessment & Plan:  Discontinue Abilify because of side effects    Start Fetzima 20 mg daily    Taper off of Viibryd by taking 10 mg daily for 7 days          2. Impaired fasting glucose  Overview:  Lab Results   Component Value Date    HGBA1C 6.0 (H) 04/05/2024         Assessment & Plan:  Metformin  mg daily     Therapeutic lifestyle changes      3. Acquired hypothyroidism  Overview:  Lab Results   Component Value Date    TSH 0.577 04/05/2024           Assessment & Plan:  Levothyroxine 100 mcg daily    Cytomel 10 mcg daily      4. Mixed hyperlipidemia  Overview:  Lab Results   Component Value Date    CHOL 172 04/05/2024    CHOL 186 06/23/2021     Lab Results   Component Value Date    HDL 54 04/05/2024    HDL 38 (L) 06/23/2021     Lab Results   Component Value Date    LDLCALC 100 (H) 04/05/2024     Lab Results   Component Value Date    TRIG 101 04/05/2024    TRIG 150 06/23/2021       No results found for: "CHOLHDL"      Assessment & Plan:  Fenofibrate 160 mg daily with goal triglycerides less than 150 and goal LDL less than 100      5. Gastroesophageal reflux disease without esophagitis  Assessment & Plan:  Pantoprazole 40 mg daily      6. Nonalcoholic steatohepatitis (GUTIÉRREZ)  Overview:  Lab Results   Component Value Date    ALT 21 04/05/2024    AST 22 04/05/2024    ALKPHOS 73 06/23/2021    BILITOT 0.2 04/05/2024         Assessment & Plan:  Lifestyle changes, low-fat diet, cholesterol control              Follow Up:  Follow up in about 2 months (around 6/16/2024) for MDD .          "

## 2024-04-16 NOTE — ASSESSMENT & PLAN NOTE
Discontinue Abilify because of side effects    Start Fetzima 20 mg daily    Taper off of Viibryd by taking 10 mg daily for 7 days

## 2024-04-17 ENCOUNTER — TELEPHONE (OUTPATIENT)
Dept: FAMILY MEDICINE | Facility: CLINIC | Age: 54
End: 2024-04-17
Payer: COMMERCIAL

## 2024-04-17 NOTE — TELEPHONE ENCOUNTER
Pt states that the med is too expensive and she will just stop the abilify and deal with the viibryd

## 2024-06-04 ENCOUNTER — TELEPHONE (OUTPATIENT)
Dept: FAMILY MEDICINE | Facility: CLINIC | Age: 54
End: 2024-06-04
Payer: COMMERCIAL

## 2024-06-04 DIAGNOSIS — F33.41 RECURRENT MAJOR DEPRESSIVE DISORDER, IN PARTIAL REMISSION: Primary | ICD-10-CM

## 2024-06-04 RX ORDER — VILAZODONE HYDROCHLORIDE 20 MG/1
1 TABLET ORAL DAILY
Qty: 30 TABLET | Refills: 11 | Status: SHIPPED | OUTPATIENT
Start: 2024-06-04

## 2024-06-10 ENCOUNTER — OFFICE VISIT (OUTPATIENT)
Dept: FAMILY MEDICINE | Facility: CLINIC | Age: 54
End: 2024-06-10
Payer: COMMERCIAL

## 2024-06-10 VITALS
HEART RATE: 92 BPM | HEIGHT: 63 IN | OXYGEN SATURATION: 97 % | WEIGHT: 183 LBS | SYSTOLIC BLOOD PRESSURE: 102 MMHG | DIASTOLIC BLOOD PRESSURE: 64 MMHG | BODY MASS INDEX: 32.43 KG/M2 | TEMPERATURE: 98 F

## 2024-06-10 DIAGNOSIS — R73.01 IMPAIRED FASTING GLUCOSE: ICD-10-CM

## 2024-06-10 DIAGNOSIS — E03.9 ACQUIRED HYPOTHYROIDISM: ICD-10-CM

## 2024-06-10 DIAGNOSIS — K75.81 NONALCOHOLIC STEATOHEPATITIS (NASH): ICD-10-CM

## 2024-06-10 DIAGNOSIS — E78.2 MIXED HYPERLIPIDEMIA: ICD-10-CM

## 2024-06-10 DIAGNOSIS — F33.41 RECURRENT MAJOR DEPRESSIVE DISORDER, IN PARTIAL REMISSION: Primary | ICD-10-CM

## 2024-06-10 PROCEDURE — 3078F DIAST BP <80 MM HG: CPT | Mod: CPTII,,, | Performed by: FAMILY MEDICINE

## 2024-06-10 PROCEDURE — 3044F HG A1C LEVEL LT 7.0%: CPT | Mod: CPTII,,, | Performed by: FAMILY MEDICINE

## 2024-06-10 PROCEDURE — 3074F SYST BP LT 130 MM HG: CPT | Mod: CPTII,,, | Performed by: FAMILY MEDICINE

## 2024-06-10 PROCEDURE — 1159F MED LIST DOCD IN RCRD: CPT | Mod: CPTII,,, | Performed by: FAMILY MEDICINE

## 2024-06-10 PROCEDURE — 99213 OFFICE O/P EST LOW 20 MIN: CPT | Mod: ,,, | Performed by: FAMILY MEDICINE

## 2024-06-10 PROCEDURE — 3008F BODY MASS INDEX DOCD: CPT | Mod: CPTII,,, | Performed by: FAMILY MEDICINE

## 2024-06-10 PROCEDURE — 1160F RVW MEDS BY RX/DR IN RCRD: CPT | Mod: CPTII,,, | Performed by: FAMILY MEDICINE

## 2024-06-10 NOTE — PROGRESS NOTES
"SUBJECTIVE:  HPI    Marj Monroy is a 53 y.o. female here for Follow-up (medication).  Since she was last seen, she did not take Fetzima because of cost and she restarted her Viibryd 20 mg daily.    She is currently doing very well.  She recently moved  to her daughter and grandchildren.  She feels like she has more purpose.  She has not having any depressed mood.  She is staying involved with her [employer] and women's groups.      Marj's allergies, medications, history, and problem list were updated as appropriate.    ROS:  Pertinent ROS as above, otherwise negative    OBJECTIVE:  Vital signs  Visit Vitals  /64 (BP Location: Right arm, Patient Position: Sitting)   Pulse 92   Temp 98.1 °F (36.7 °C) (Temporal)   Ht 5' 2.6" (1.59 m)   Wt 83 kg (183 lb)   LMP  (LMP Unknown)   SpO2 97%   BMI 32.83 kg/m²          PHYSICAL EXAM:  General: Awake, alert, no acute distress  Psychiatric: Mood and affect are normal.  Good judgment and insight.  No suicidal ideation.        ASSESSMENT/PLAN:  1. Recurrent major depressive disorder, in partial remission  Overview:  Tried and failed the following drugs in the past:  Celexa, Lexapro, Zoloft, Trintellix, Cymbalta, Paxil    Assessment & Plan:  Currently doing well on Viibryd 20 mg daily      2. Mixed hyperlipidemia  Overview:  Lab Results   Component Value Date    CHOL 172 04/05/2024    CHOL 186 06/23/2021     Lab Results   Component Value Date    HDL 54 04/05/2024    HDL 38 (L) 06/23/2021     Lab Results   Component Value Date    LDLCALC 100 (H) 04/05/2024     Lab Results   Component Value Date    TRIG 101 04/05/2024    TRIG 150 06/23/2021       No results found for: "CHOLHDL"      Assessment & Plan:  Fenofibrate 160 mg daily with goal triglycerides less than 150 and goal LDL less than 100    Orders:  -     Lipid Panel; Future; Expected date: 09/10/2024    3. Acquired hypothyroidism  Overview:  Lab Results   Component Value Date    TSH 0.577 04/05/2024 "           Assessment & Plan:  Levothyroxine 100 mcg daily    Cytomel 10 mcg daily    Orders:  -     TSH; Future; Expected date: 09/10/2024    4. Impaired fasting glucose  Overview:  Lab Results   Component Value Date    HGBA1C 6.0 (H) 04/05/2024         Assessment & Plan:  Metformin  mg daily     Therapeutic lifestyle changes    Orders:  -     Hemoglobin A1C; Future; Expected date: 09/10/2024    5. Nonalcoholic steatohepatitis (GUTIÉRREZ)  Overview:  Lab Results   Component Value Date    ALT 21 04/05/2024    AST 22 04/05/2024    ALKPHOS 73 06/23/2021    BILITOT 0.2 04/05/2024         Assessment & Plan:  Lifestyle changes, low-fat diet, cholesterol control    Orders:  -     Comprehensive Metabolic Panel; Future; Expected date: 09/10/2024            Follow Up:  Follow up in about 3 months (around 9/10/2024) for chronic health conditions, Fasting labs.

## 2024-06-10 NOTE — ASSESSMENT & PLAN NOTE
Levothyroxine 100 mcg daily    Cytomel 10 mcg daily   - TTE: mobile mass, possibly in association with the tricuspid valve This may represent tumour, thrombus, or vegetation.  - management as per primary team.

## 2024-07-18 ENCOUNTER — OFFICE VISIT (OUTPATIENT)
Dept: FAMILY MEDICINE | Facility: CLINIC | Age: 54
End: 2024-07-18
Payer: COMMERCIAL

## 2024-07-18 ENCOUNTER — HOSPITAL ENCOUNTER (OUTPATIENT)
Dept: RADIOLOGY | Facility: HOSPITAL | Age: 54
Discharge: HOME | End: 2024-07-18
Attending: FAMILY MEDICINE
Payer: COMMERCIAL

## 2024-07-18 VITALS
BODY MASS INDEX: 32.18 KG/M2 | TEMPERATURE: 98 F | SYSTOLIC BLOOD PRESSURE: 118 MMHG | WEIGHT: 181.63 LBS | HEART RATE: 83 BPM | HEIGHT: 63 IN | OXYGEN SATURATION: 94 % | DIASTOLIC BLOOD PRESSURE: 70 MMHG

## 2024-07-18 DIAGNOSIS — M47.812 SPONDYLOSIS OF CERVICAL SPINE: Primary | ICD-10-CM

## 2024-07-18 DIAGNOSIS — M47.812 SPONDYLOSIS OF CERVICAL SPINE: ICD-10-CM

## 2024-07-18 DIAGNOSIS — L21.9 SEBORRHEIC DERMATITIS: ICD-10-CM

## 2024-07-18 PROCEDURE — 1160F RVW MEDS BY RX/DR IN RCRD: CPT | Mod: CPTII,,, | Performed by: FAMILY MEDICINE

## 2024-07-18 PROCEDURE — 3044F HG A1C LEVEL LT 7.0%: CPT | Mod: CPTII,,, | Performed by: FAMILY MEDICINE

## 2024-07-18 PROCEDURE — 3008F BODY MASS INDEX DOCD: CPT | Mod: CPTII,,, | Performed by: FAMILY MEDICINE

## 2024-07-18 PROCEDURE — 72050 X-RAY EXAM NECK SPINE 4/5VWS: CPT | Mod: TC

## 2024-07-18 PROCEDURE — 3078F DIAST BP <80 MM HG: CPT | Mod: CPTII,,, | Performed by: FAMILY MEDICINE

## 2024-07-18 PROCEDURE — 99214 OFFICE O/P EST MOD 30 MIN: CPT | Mod: ,,, | Performed by: FAMILY MEDICINE

## 2024-07-18 PROCEDURE — 1159F MED LIST DOCD IN RCRD: CPT | Mod: CPTII,,, | Performed by: FAMILY MEDICINE

## 2024-07-18 PROCEDURE — 3074F SYST BP LT 130 MM HG: CPT | Mod: CPTII,,, | Performed by: FAMILY MEDICINE

## 2024-07-18 RX ORDER — PREDNISONE 10 MG/1
40 TABLET ORAL DAILY
Qty: 28 TABLET | Refills: 0 | Status: SHIPPED | OUTPATIENT
Start: 2024-07-18 | End: 2024-07-25

## 2024-07-18 RX ORDER — HYDROCODONE BITARTRATE AND ACETAMINOPHEN 5; 325 MG/1; MG/1
1 TABLET ORAL EVERY 6 HOURS PRN
Qty: 12 TABLET | Refills: 0 | Status: SHIPPED | OUTPATIENT
Start: 2024-07-18

## 2024-07-18 RX ORDER — METHOCARBAMOL 500 MG/1
500 TABLET, FILM COATED ORAL 3 TIMES DAILY PRN
Qty: 30 TABLET | Refills: 0 | Status: SHIPPED | OUTPATIENT
Start: 2024-07-18 | End: 2024-07-28

## 2024-07-18 NOTE — PROGRESS NOTES
"SUBJECTIVE:  HPI    Marj Monroy is a 54 y.o. female here for Shoulder Pain, Neck Pain, and Rash on nose.     She has a history of cervical spine fusion.  She presents with complaints of posterior neck pain, predominantly on the right side but bilaterally.  The pain radiates up into the base of the skull and has been causing some headaches.  She has constant pain that is exacerbated with a riding in her car down bumpy roads.  She is also now having some paresthesias in the fingers of both hands.  The pain is described as moderate to severe in intensity on occasion.  Her symptoms are partially relieved with ice and heat and anti-inflammatories.  She denies any motor weakness.      She also complains of an itching and burning rash around of the nose.    Marj's allergies, medications, history, and problem list were updated as appropriate.    ROS:  Pertinent ROS as above, otherwise negative    OBJECTIVE:  Vital signs  Visit Vitals  /70 (BP Location: Left arm, Patient Position: Sitting, BP Method: Medium (Manual))   Pulse 83   Temp 98.4 °F (36.9 °C) (Temporal)   Ht 5' 2.6" (1.59 m)   Wt 82.4 kg (181 lb 9.6 oz)   LMP  (LMP Unknown)   SpO2 (!) 94%   BMI 32.58 kg/m²          PHYSICAL EXAM:  General: Awake, alert, no acute distress  Neck: Decreased range of motion with lateral rotation bilaterally to about 45°.  Bilateral paracervical spinous muscles tenderness and spasm especially near the occipital region of the skull in the upper trapezius area.  Neuro: Bilateral upper extremity motor function 5/5 in all groups, sensation light touch intact throughout, reflexes 2+  Skin:  Erythematous and scaly rash in the nasolabial folds      ASSESSMENT/PLAN:  1. Spondylosis of cervical spine  -     X-Ray Cervical Spine Complete 5 view; Future; Expected date: 07/18/2024  -     predniSONE (DELTASONE) 10 MG tablet; Take 4 tablets (40 mg total) by mouth once daily. for 7 days  Dispense: 28 tablet; Refill: 0  -     " HYDROcodone-acetaminophen (NORCO) 5-325 mg per tablet; Take 1 tablet by mouth every 6 (six) hours as needed for Pain.  Dispense: 12 tablet; Refill: 0  -     methocarbamoL (ROBAXIN) 500 MG Tab; Take 1 tablet (500 mg total) by mouth 3 (three) times daily as needed (Muscle spasm).  Dispense: 30 tablet; Refill: 0    2. Seborrheic dermatitis  Assessment & Plan:  Selsun blue shampoo washes          Follow Up:  Follow up in about 3 weeks (around 8/8/2024) for Cervical spine DJD.

## 2024-07-22 ENCOUNTER — TELEPHONE (OUTPATIENT)
Dept: FAMILY MEDICINE | Facility: CLINIC | Age: 54
End: 2024-07-22
Payer: COMMERCIAL

## 2024-07-22 NOTE — TELEPHONE ENCOUNTER
----- Message from Jack Lindquist MD sent at 7/22/2024  8:53 AM CDT -----  X-rays showed arthritis above her fusion   We will see how she is in 3 weeks

## 2024-07-27 ENCOUNTER — ON-DEMAND VIRTUAL (OUTPATIENT)
Dept: URGENT CARE | Facility: CLINIC | Age: 54
End: 2024-07-27
Payer: COMMERCIAL

## 2024-07-27 DIAGNOSIS — J32.9 SINUSITIS, UNSPECIFIED CHRONICITY, UNSPECIFIED LOCATION: Primary | ICD-10-CM

## 2024-07-27 PROCEDURE — 99213 OFFICE O/P EST LOW 20 MIN: CPT | Mod: 95,,, | Performed by: NURSE PRACTITIONER

## 2024-07-27 RX ORDER — PREDNISONE 20 MG/1
20 TABLET ORAL DAILY
Qty: 5 TABLET | Refills: 0 | Status: SHIPPED | OUTPATIENT
Start: 2024-07-27 | End: 2024-08-01

## 2024-07-27 NOTE — PROGRESS NOTES
Subjective:      Patient ID: Marj Monroy is a 54 y.o. female.    Vitals:  vitals were not taken for this visit.     Chief Complaint: Sore Throat      Visit Type: TELE AUDIOVISUAL    Present with the patient at the time of consultation: TELEMED PRESENT WITH PATIENT: None        Past Medical History:   Diagnosis Date    Allergic rhinitis 2/8/2023    Gastroesophageal reflux disease 2/8/2023    Hypothyroidism 2/8/2023    Impaired fasting glucose 2/8/2023    Irritable bowel syndrome 2/8/2023    Lung nodule 2/8/2023    left lung- monitored x 5 years and stable CT chest    5/10/18  old granulomatous disease    Mixed hyperlipidemia 2/8/2023    Nonalcoholic steatohepatitis (GUTIÉRREZ) 2/8/2023    Personal history of colonic polyps     Posttraumatic stress disorder 2/8/2023    Recurrent major depressive disorder 2/8/2023    Spondylosis of cervical spine 2/8/2023     Past Surgical History:   Procedure Laterality Date    CARDIAC CATHETERIZATION  02/13/2017    Normal coronary arteries, normal ejection fraction, no mitral valve or aortic valve disease    CHOLECYSTECTOMY  09/27/2017    COLONOSCOPY  12/12/2019    FRACTURE SURGERY      FUSION, SPINE, CERVICAL  07/2019    OPEN REDUCTION AND INTERNAL FIXATION (ORIF) OF INJURY OF SHOULDER Left 06/18/2020    TOTAL ABDOMINAL HYSTERECTOMY N/A      Review of patient's allergies indicates:   Allergen Reactions    Levofloxacin Other (See Comments)     Joint pain  Other reaction(s): Pain intensity    Penicillins      Other reaction(s): Vomiting     Current Outpatient Medications on File Prior to Visit   Medication Sig Dispense Refill    ALAHIST PE 2-7.5 mg Tab Take 1 tablet by mouth as needed.      cholecalciferol, vitamin D3, 75 mcg (3,000 unit) Tab Take 1 tablet by mouth once daily.      CYTOMEL 5 mcg Tab Take 2 tablets (10 mcg total) by mouth once daily. 60 tablet 11    fenofibrate 160 MG Tab Take 1 tablet (160 mg total) by mouth once daily. 90 tablet 1    HYDROcodone-acetaminophen (NORCO)  5-325 mg per tablet Take 1 tablet by mouth every 6 (six) hours as needed for Pain. 12 tablet 0    levothyroxine (SYNTHROID) 100 MCG tablet Take 1 tablet (100 mcg total) by mouth every morning. 30 tablet 11    magnesium oxide (MAG-OX) 400 mg (241.3 mg magnesium) tablet Take 400 mg by mouth once daily.      methocarbamoL (ROBAXIN) 500 MG Tab Take 1 tablet (500 mg total) by mouth 3 (three) times daily as needed (Muscle spasm). 30 tablet 0    vilazodone (VIIBRYD) 20 mg Tab Take 1 tablet (20 mg total) by mouth Daily. 30 tablet 11    vitamin B comp and C no.3 (B COMPLEX PLUS VITAMIN C) 15-10-50-5-300 mg Cap        No current facility-administered medications on file prior to visit.     Family History   Problem Relation Name Age of Onset    Heart disease Mother      Atrial fibrillation Mother         Medications Ordered                [id number] - [place] - [address]   [address] [address]    Telephone: [phone]   Fax: [phone]   Hours: Not open 24 hours                         E-Prescribed (1 of 1)              predniSONE (DELTASONE) 20 MG tablet    Sig: Take 1 tablet (20 mg total) by mouth once daily. for 5 days       Start: 7/27/24     Quantity: 5 tablet Refills: 0                           Ohs Peq Odvv Intake    7/27/2024  9:05 AM CDT - Filed by Patient   What is your current physical address in the event of a medical emergency? [address] [place] [address]   Are you able to take your vital signs? Yes   Systolic Blood Pressure: 122   Diastolic Blood Pressure: 68   Weight: 180   Height: 63   Pulse: 82   Temperature: 98.4   Respiration rate: 18   Pulse Oxygen: 96   Please attach any relevant images or files          53 yo femalewith c/o sinus congestion, pnd, pressure for one day. She denies fever. Denies chest congestion and cough. She states she usually gets sinus infections        Constitution: Negative.   HENT:  Positive for congestion, postnasal drip, sinus pressure and sore  throat.    Cardiovascular: Negative.    Respiratory: Negative.     Gastrointestinal: Negative.    Endocrine: negative.   Genitourinary: Negative.  Negative for frequency and urgency.   Musculoskeletal: Negative.    Skin: Negative.    Allergic/Immunologic: Negative.    Neurological: Negative.    Hematologic/Lymphatic: Negative.    Psychiatric/Behavioral: Negative.          Objective:   The physical exam was conducted virtually.  LOCATION OF PATIENT home  Physical Exam   Constitutional: She is oriented to person, place, and time. She appears well-developed.   HENT:   Head: Normocephalic and atraumatic.   Ears:   Right Ear: Hearing, tympanic membrane and external ear normal.   Left Ear: Hearing, tympanic membrane and external ear normal.   Nose: Congestion present.   Mouth/Throat: Uvula is midline, oropharynx is clear and moist and mucous membranes are normal.   Eyes: Conjunctivae and EOM are normal. Pupils are equal, round, and reactive to light.   Neck: Neck supple.   Cardiovascular: Normal rate.   Pulmonary/Chest: Effort normal and breath sounds normal.   Musculoskeletal: Normal range of motion.         General: Normal range of motion.   Neurological: She is alert and oriented to person, place, and time.   Skin: Skin is warm.   Psychiatric: Her behavior is normal. Thought content normal.   Nursing note and vitals reviewed.      Assessment:     1. Sinusitis, unspecified chronicity, unspecified location        Plan:   -Below are suggestions for symptomatic relief:              -Tylenol every 4 hours OR ibuprofen every 6 hours as needed for pain/fever.              -Salt water gargles to soothe throat pain.              -Chloroseptic spray also helps to numb throat pain.              -Nasal saline spray reduces inflammation and dryness.              -Warm face compresses to help with facial sinus pain/pressure.              -Vicks vapor rub at night.              -Flonase OTC or Nasacort OTC for nasal congestion.               -Simple foods like chicken noodle soup.              -Delsym helps with coughing at night              -Zyrtec/Claritin during the day & Benadryl at night may help with allergies.     If you DO NOT have Hypertension or any history of palpitations, it is ok to take over the counter Sudafed or Mucinex D or Allegra-D or Claritin-D or Zyrtec-D.  If you do take one of the above, it is ok to combine that with plain over the counter Mucinex or Allegra or Claritin or Zyrtec. If, for example, you are taking Zyrtec -D, you can combine that with Mucinex, but not Mucinex-D.  If you are taking Mucinex-D, you can combine that with plain Allegra or Claritin or Zyrtec.   If you DO have Hypertension or palpitations, it is safe to take Coricidin HBP for relief of sinus symptoms.     Please follow up with your Primary care provider within 2-5 days if your signs and symptoms have not resolved or worsen.      If your condition worsens or fails to improve we recommend that you receive another evaluation at the emergency room immediately or contact your primary medical clinic to discuss your concerns.   You must understand that you have received an Urgent Care treatment only and that you may be released before all of your medical problems are known or treated. You, the patient, will arrange for follow up care as instructed.      RED FLAGS/WARNING SYMPTOMS DISCUSSED WITH PATIENT THAT WOULD WARRANT EMERGENT MEDICAL ATTENTION. PATIENT VERBALIZED UNDERSTANDING.       Sinusitis, unspecified chronicity, unspecified location  -     predniSONE (DELTASONE) 20 MG tablet; Take 1 tablet (20 mg total) by mouth once daily. for 5 days  Dispense: 5 tablet; Refill: 0

## 2024-08-06 ENCOUNTER — OFFICE VISIT (OUTPATIENT)
Dept: FAMILY MEDICINE | Facility: CLINIC | Age: 54
End: 2024-08-06
Payer: COMMERCIAL

## 2024-08-06 VITALS
TEMPERATURE: 97 F | HEIGHT: 63 IN | DIASTOLIC BLOOD PRESSURE: 70 MMHG | HEART RATE: 83 BPM | BODY MASS INDEX: 32.28 KG/M2 | WEIGHT: 182.19 LBS | OXYGEN SATURATION: 98 % | SYSTOLIC BLOOD PRESSURE: 132 MMHG

## 2024-08-06 DIAGNOSIS — F33.41 RECURRENT MAJOR DEPRESSIVE DISORDER, IN PARTIAL REMISSION: ICD-10-CM

## 2024-08-06 DIAGNOSIS — M47.812 SPONDYLOSIS OF CERVICAL SPINE: Primary | ICD-10-CM

## 2024-08-06 PROCEDURE — 1160F RVW MEDS BY RX/DR IN RCRD: CPT | Mod: CPTII,,, | Performed by: FAMILY MEDICINE

## 2024-08-06 PROCEDURE — 99214 OFFICE O/P EST MOD 30 MIN: CPT | Mod: ,,, | Performed by: FAMILY MEDICINE

## 2024-08-06 PROCEDURE — 3078F DIAST BP <80 MM HG: CPT | Mod: CPTII,,, | Performed by: FAMILY MEDICINE

## 2024-08-06 PROCEDURE — 3075F SYST BP GE 130 - 139MM HG: CPT | Mod: CPTII,,, | Performed by: FAMILY MEDICINE

## 2024-08-06 PROCEDURE — 1159F MED LIST DOCD IN RCRD: CPT | Mod: CPTII,,, | Performed by: FAMILY MEDICINE

## 2024-08-06 PROCEDURE — 3044F HG A1C LEVEL LT 7.0%: CPT | Mod: CPTII,,, | Performed by: FAMILY MEDICINE

## 2024-08-06 PROCEDURE — 3008F BODY MASS INDEX DOCD: CPT | Mod: CPTII,,, | Performed by: FAMILY MEDICINE

## 2024-08-06 RX ORDER — VILAZODONE HYDROCHLORIDE 20 MG/1
1 TABLET ORAL DAILY
Qty: 90 TABLET | Refills: 3 | Status: SHIPPED | OUTPATIENT
Start: 2024-08-06 | End: 2025-08-06

## 2024-09-09 ENCOUNTER — OFFICE VISIT (OUTPATIENT)
Dept: FAMILY MEDICINE | Facility: CLINIC | Age: 54
End: 2024-09-09
Payer: COMMERCIAL

## 2024-09-09 VITALS
BODY MASS INDEX: 32.28 KG/M2 | HEART RATE: 83 BPM | OXYGEN SATURATION: 97 % | TEMPERATURE: 98 F | SYSTOLIC BLOOD PRESSURE: 122 MMHG | HEIGHT: 63 IN | WEIGHT: 182.19 LBS | DIASTOLIC BLOOD PRESSURE: 74 MMHG

## 2024-09-09 DIAGNOSIS — R09.89 UPPER RESPIRATORY SYMPTOM: ICD-10-CM

## 2024-09-09 DIAGNOSIS — J10.1 INFLUENZA A: Primary | ICD-10-CM

## 2024-09-09 LAB
CTP QC/QA: YES
CTP QC/QA: YES
FLUAV AG NPH QL: POSITIVE
FLUBV AG NPH QL: NEGATIVE
SARS-COV-2 AG RESP QL IA.RAPID: NEGATIVE

## 2024-09-09 PROCEDURE — 3078F DIAST BP <80 MM HG: CPT | Mod: CPTII,,, | Performed by: NURSE PRACTITIONER

## 2024-09-09 PROCEDURE — 87811 SARS-COV-2 COVID19 W/OPTIC: CPT | Mod: QW,,, | Performed by: NURSE PRACTITIONER

## 2024-09-09 PROCEDURE — 3044F HG A1C LEVEL LT 7.0%: CPT | Mod: CPTII,,, | Performed by: NURSE PRACTITIONER

## 2024-09-09 PROCEDURE — 96372 THER/PROPH/DIAG INJ SC/IM: CPT | Mod: ,,, | Performed by: NURSE PRACTITIONER

## 2024-09-09 PROCEDURE — 3074F SYST BP LT 130 MM HG: CPT | Mod: CPTII,,, | Performed by: NURSE PRACTITIONER

## 2024-09-09 PROCEDURE — 1159F MED LIST DOCD IN RCRD: CPT | Mod: CPTII,,, | Performed by: NURSE PRACTITIONER

## 2024-09-09 PROCEDURE — 3008F BODY MASS INDEX DOCD: CPT | Mod: CPTII,,, | Performed by: NURSE PRACTITIONER

## 2024-09-09 PROCEDURE — 99213 OFFICE O/P EST LOW 20 MIN: CPT | Mod: 25,,, | Performed by: NURSE PRACTITIONER

## 2024-09-09 PROCEDURE — 87400 INFLUENZA A/B EACH AG IA: CPT | Mod: QW,,, | Performed by: NURSE PRACTITIONER

## 2024-09-09 RX ORDER — DEXAMETHASONE SODIUM PHOSPHATE 10 MG/ML
10 INJECTION INTRAMUSCULAR; INTRAVENOUS
Status: COMPLETED | OUTPATIENT
Start: 2024-09-09 | End: 2024-09-09

## 2024-09-09 RX ADMIN — DEXAMETHASONE SODIUM PHOSPHATE 10 MG: 10 INJECTION INTRAMUSCULAR; INTRAVENOUS at 08:09

## 2024-09-09 NOTE — PROGRESS NOTES
SUBJECTIVE:  Marj Monroy is a 54 y.o. female here for Cough (Started Friday)      Cough  This is a new problem. The current episode started yesterday. The problem has been gradually worsening. The problem occurs hourly. The cough is Productive of sputum. Associated symptoms include ear congestion, ear pain, headaches, myalgias, nasal congestion, postnasal drip, rhinorrhea and a sore throat. Pertinent negatives include no chest pain, chills, fever, heartburn, hemoptysis, rash, shortness of breath, sweats, weight loss or wheezing. Nothing aggravates the symptoms. She has tried OTC cough suppressant and rest for the symptoms. The treatment provided mild relief. Her past medical history is significant for bronchitis, environmental allergies and pneumonia. There is no history of asthma, bronchiectasis, COPD or emphysema.     Presents to the clinc with cough, congestion and fatigue.  Symptoms started 3 days ago.  Daughter has been sick with similar symptoms.  She is  a Home Health Nurse.  Marj's allergies, medications, history, and problem list were updated as appropriate.    Review of Systems   Constitutional:  Negative for chills, fever and weight loss.   HENT:  Positive for ear pain, postnasal drip, rhinorrhea and sore throat.    Respiratory:  Positive for cough. Negative for hemoptysis, shortness of breath and wheezing.    Cardiovascular:  Negative for chest pain.   Gastrointestinal:  Negative for heartburn.   Musculoskeletal:  Positive for myalgias.   Skin:  Negative for rash.   Allergic/Immunologic: Positive for environmental allergies.   Neurological:  Positive for headaches.      A comprehensive review of symptoms was completed and negative except as noted above.    Recent Results (from the past 504 hour(s))   POCT Influenza A/B Rapid Antigen    Collection Time: 09/09/24  8:42 AM   Result Value Ref Range    Rapid Influenza A Ag Positive (A) Negative    Rapid Influenza B Ag Negative Negative      "Acceptable Yes    SARS Coronavirus 2 Antigen, POCT Manual Read    Collection Time: 09/09/24  8:43 AM   Result Value Ref Range    SARS Coronavirus 2 Antigen Negative Negative     Acceptable Yes        OBJECTIVE:  Vital signs  Vitals:    09/09/24 0820   BP: 122/74   BP Location: Right arm   Patient Position: Sitting   BP Method: Medium (Manual)   Pulse: 83   Temp: 97.7 °F (36.5 °C)   TempSrc: Oral   SpO2: 97%   Weight: 82.6 kg (182 lb 3.2 oz)   Height: 5' 2.6" (1.59 m)        Physical Exam  Constitutional:       Appearance: Normal appearance.   HENT:      Head: Normocephalic and atraumatic.      Right Ear: Tympanic membrane, ear canal and external ear normal.      Left Ear: Tympanic membrane, ear canal and external ear normal.      Nose: Congestion present.      Mouth/Throat:      Mouth: Mucous membranes are moist.      Pharynx: Oropharynx is clear. Posterior oropharyngeal erythema (mild with PND) present.   Eyes:      Conjunctiva/sclera: Conjunctivae normal.   Cardiovascular:      Rate and Rhythm: Normal rate and regular rhythm.   Pulmonary:      Effort: Pulmonary effort is normal.      Breath sounds: Normal breath sounds.   Abdominal:      General: Bowel sounds are normal.      Palpations: Abdomen is soft.   Skin:     General: Skin is warm.      Capillary Refill: Capillary refill takes less than 2 seconds.   Neurological:      Mental Status: She is alert.          ASSESSMENT/PLAN:  1. Influenza A  Comments:  increase fluids, tylenol/ibuprofen prn fever/pain, OTC cough/congestion med of patient's choice, follow-up if symptoms persisit >7-10 days  Orders:  -     dexAMETHasone injection 10 mg    2. Upper respiratory symptom  -     POCT Influenza A/B Rapid Antigen  -     SARS Coronavirus 2 Antigen, POCT Manual Read        Follow Up:  Follow up if symptoms worsen or fail to improve.            "

## 2024-10-24 ENCOUNTER — OFFICE VISIT (OUTPATIENT)
Dept: FAMILY MEDICINE | Facility: CLINIC | Age: 54
End: 2024-10-24
Payer: COMMERCIAL

## 2024-10-24 VITALS
OXYGEN SATURATION: 98 % | WEIGHT: 184.81 LBS | HEIGHT: 63 IN | SYSTOLIC BLOOD PRESSURE: 126 MMHG | TEMPERATURE: 98 F | BODY MASS INDEX: 32.75 KG/M2 | DIASTOLIC BLOOD PRESSURE: 78 MMHG | HEART RATE: 84 BPM

## 2024-10-24 DIAGNOSIS — Z00.01 ENCOUNTER FOR WELL ADULT EXAM WITH ABNORMAL FINDINGS: Primary | ICD-10-CM

## 2024-10-24 DIAGNOSIS — R73.01 IMPAIRED FASTING GLUCOSE: ICD-10-CM

## 2024-10-24 DIAGNOSIS — E78.2 MIXED HYPERLIPIDEMIA: ICD-10-CM

## 2024-10-24 DIAGNOSIS — E03.9 ACQUIRED HYPOTHYROIDISM: ICD-10-CM

## 2024-10-24 DIAGNOSIS — F98.8 ADD (ATTENTION DEFICIT DISORDER) WITHOUT HYPERACTIVITY: ICD-10-CM

## 2024-10-24 DIAGNOSIS — F33.41 RECURRENT MAJOR DEPRESSIVE DISORDER, IN PARTIAL REMISSION: ICD-10-CM

## 2024-10-24 DIAGNOSIS — L21.9 SEBORRHEIC DERMATITIS: ICD-10-CM

## 2024-10-24 DIAGNOSIS — K75.81 NONALCOHOLIC STEATOHEPATITIS (NASH): ICD-10-CM

## 2024-10-24 RX ORDER — CICLOPIROX OLAMINE 7.7 MG/G
CREAM TOPICAL 2 TIMES DAILY
Qty: 30 G | Refills: 2 | Status: SHIPPED | OUTPATIENT
Start: 2024-10-24 | End: 2024-11-23

## 2024-10-24 RX ORDER — METHYLPHENIDATE HYDROCHLORIDE 18 MG/1
18 TABLET ORAL EVERY MORNING
Qty: 30 TABLET | Refills: 0 | Status: SHIPPED | OUTPATIENT
Start: 2024-10-24 | End: 2024-11-23

## 2024-10-24 NOTE — ASSESSMENT & PLAN NOTE
Start methylphenidate extended release 18 mg daily for 7 days.  If no response, increase to 36 mg daily    We will see her back in 3 weeks

## 2024-10-24 NOTE — PROGRESS NOTES
"SUBJECTIVE:  HPI    Marj Monroy is a 54 y.o. female here for Wellness.     Today, she complains of persistent rash around the mouth, nasolabial folds, between the eyebrows that has not improved despite using Selsun blue shampoo washes.    She also complains of difficulty focusing, concentrating, staying on task, completing tasks that has been causing difficulty with work related functions and maintenance of job responsibilities.  She reports that she was diagnosed with ADD as a young adult.  She never took medication.  She recently got a new job and is concerned about job performance because of for these symptoms.  She denies any depression.  She does report that she has some anxiousness.    Marj's allergies, medications, history, and problem list were updated as appropriate.    ROS:  Pertinent ROS as above, otherwise negative 12 point review of systems    OBJECTIVE:  Vital signs  Visit Vitals  /78 (BP Location: Left arm, Patient Position: Sitting)   Pulse 84   Temp 97.6 °F (36.4 °C) (Oral)   Ht 5' 2.6" (1.59 m)   Wt 83.8 kg (184 lb 12.8 oz)   LMP  (LMP Unknown)   SpO2 98%   BMI 33.16 kg/m²          PHYSICAL EXAM:  General: Awake, alert, no acute distress  ENT:  External auditory canals normal bilaterally .  Tympanic membranes normal bilaterally.  Oropharynx clear.    Neck:  No bruits, no masses  Cardiovascular:  Regular rhythm.  Normal rate.  No murmurs.  Respiratory: Clear to auscultation bilaterally, normal effort  Abdomen: Soft, nontender, nondistended, no hepatosplenomegaly   Extremities:  No cyanosis, no clubbing, no edema  Skin:  No rashes or appreciable lesions   Neuro:  Cranial nerves 2-12 are intact with usual testing.  Gait is normal.  Moves all 4 extremities equally and symmetrically.  Psychiatric:  Normal mood and affect.    Chemistry:  Lab Results   Component Value Date     09/09/2024    K 4.5 09/09/2024    BUN 15 09/09/2024    CREATININE 0.90 09/09/2024    EGFRNORACEVR 75 09/09/2024    " "GLUCOSE 109 06/23/2021    CALCIUM 9.6 09/09/2024    ALKPHOS 73 06/23/2021    AST 22 09/09/2024    ALT 29 09/09/2024    OQFHJPXL80QR 57.70 06/23/2021    TSH 1.220 09/09/2024        Lab Results   Component Value Date    HGBA1C 5.7 (H) 09/09/2024        Hematology:  Lab Results   Component Value Date    WBC 7.4 09/09/2024    HGB 14.2 09/09/2024    MCV 90 09/09/2024     09/09/2024       Lipid Panel:  Lab Results   Component Value Date    CHOL 184 09/09/2024    HDL 48 09/09/2024    TRIG 197 (H) 09/09/2024        ASSESSMENT/PLAN:  1. Encounter for well adult exam with abnormal findings  Assessment & Plan:  Therapeutic lifestyle changes      2. Nonalcoholic steatohepatitis (GUTIÉRREZ)  Overview:  Lab Results   Component Value Date    ALT 29 09/09/2024    AST 22 09/09/2024    ALKPHOS 73 06/23/2021    BILITOT 0.2 09/09/2024         Assessment & Plan:  Lifestyle changes, low-fat diet, cholesterol control      3. Impaired fasting glucose  Overview:  Lab Results   Component Value Date    HGBA1C 5.7 (H) 09/09/2024         Assessment & Plan:  Therapeutic lifestyle changes      4. Mixed hyperlipidemia  Overview:  Lab Results   Component Value Date    CHOL 184 09/09/2024    CHOL 172 04/05/2024    CHOL 186 06/23/2021     Lab Results   Component Value Date    HDL 48 09/09/2024    HDL 54 04/05/2024    HDL 38 (L) 06/23/2021     Lab Results   Component Value Date    LDLCALC 102 (H) 09/09/2024    LDLCALC 100 (H) 04/05/2024     Lab Results   Component Value Date    TRIG 197 (H) 09/09/2024    TRIG 101 04/05/2024    TRIG 150 06/23/2021       No results found for: "CHOLHDL"      Assessment & Plan:  Fenofibrate 160 mg daily with goal triglycerides less than 150 and goal LDL less than 100      5. Acquired hypothyroidism  Overview:  Lab Results   Component Value Date    TSH 1.220 09/09/2024           Assessment & Plan:  Levothyroxine 100 mcg daily    Cytomel 10 mcg daily      6. Recurrent major depressive disorder, in partial " remission  Overview:  Tried and failed the following drugs in the past:  Celexa, Lexapro, Zoloft, Trintellix, Cymbalta, Paxil    Assessment & Plan:  Viibryd 20 mg daily      7. Seborrheic dermatitis  Assessment & Plan:  She felt Selsun blue so we will try ciclopirox 0.77% cream twice daily    Orders:  -     ciclopirox (LOPROX) 0.77 % Crea; Apply topically 2 (two) times daily.  Dispense: 30 g; Refill: 2    8. ADD (attention deficit disorder) without hyperactivity  Assessment & Plan:  Start methylphenidate extended release 18 mg daily for 7 days.  If no response, increase to 36 mg daily    We will see her back in 3 weeks    Orders:  -     methylphenidate HCl 18 MG CR tablet; Take 1 tablet (18 mg total) by mouth every morning.  Dispense: 30 tablet; Refill: 0            Follow Up:  Follow up in about 3 weeks (around 11/14/2024) for ADD, seborrhea.

## 2024-11-05 ENCOUNTER — OFFICE VISIT (OUTPATIENT)
Dept: FAMILY MEDICINE | Facility: CLINIC | Age: 54
End: 2024-11-05
Payer: COMMERCIAL

## 2024-11-05 VITALS
OXYGEN SATURATION: 97 % | HEART RATE: 89 BPM | BODY MASS INDEX: 32.49 KG/M2 | HEIGHT: 63 IN | SYSTOLIC BLOOD PRESSURE: 122 MMHG | TEMPERATURE: 98 F | DIASTOLIC BLOOD PRESSURE: 68 MMHG | WEIGHT: 183.38 LBS

## 2024-11-05 DIAGNOSIS — H66.002 NON-RECURRENT ACUTE SUPPURATIVE OTITIS MEDIA OF LEFT EAR WITHOUT SPONTANEOUS RUPTURE OF TYMPANIC MEMBRANE: Primary | ICD-10-CM

## 2024-11-05 DIAGNOSIS — R50.9 FEVER, UNSPECIFIED FEVER CAUSE: ICD-10-CM

## 2024-11-05 LAB
CTP QC/QA: YES
CTP QC/QA: YES
POC MOLECULAR INFLUENZA A AGN: NEGATIVE
POC MOLECULAR INFLUENZA B AGN: NEGATIVE
SARS-COV-2 AG RESP QL IA.RAPID: NEGATIVE

## 2024-11-05 PROCEDURE — 3078F DIAST BP <80 MM HG: CPT | Mod: CPTII,,, | Performed by: NURSE PRACTITIONER

## 2024-11-05 PROCEDURE — 1159F MED LIST DOCD IN RCRD: CPT | Mod: CPTII,,, | Performed by: NURSE PRACTITIONER

## 2024-11-05 PROCEDURE — 3044F HG A1C LEVEL LT 7.0%: CPT | Mod: CPTII,,, | Performed by: NURSE PRACTITIONER

## 2024-11-05 PROCEDURE — 99214 OFFICE O/P EST MOD 30 MIN: CPT | Mod: ,,, | Performed by: NURSE PRACTITIONER

## 2024-11-05 PROCEDURE — 3008F BODY MASS INDEX DOCD: CPT | Mod: CPTII,,, | Performed by: NURSE PRACTITIONER

## 2024-11-05 PROCEDURE — 87811 SARS-COV-2 COVID19 W/OPTIC: CPT | Mod: QW,,, | Performed by: NURSE PRACTITIONER

## 2024-11-05 PROCEDURE — 3074F SYST BP LT 130 MM HG: CPT | Mod: CPTII,,, | Performed by: NURSE PRACTITIONER

## 2024-11-05 PROCEDURE — 87502 INFLUENZA DNA AMP PROBE: CPT | Mod: QW,,, | Performed by: NURSE PRACTITIONER

## 2024-11-05 RX ORDER — MINOCYCLINE HYDROCHLORIDE 100 MG/1
100 TABLET ORAL EVERY 12 HOURS
COMMUNITY

## 2024-11-05 RX ORDER — CEFDINIR 300 MG/1
300 CAPSULE ORAL 2 TIMES DAILY
Qty: 20 CAPSULE | Refills: 0 | Status: SHIPPED | OUTPATIENT
Start: 2024-11-05 | End: 2024-11-15

## 2024-11-05 NOTE — PROGRESS NOTES
"SUBJECTIVE:  Marj Monroy is a 54 y.o. female here for Cough (Cough congestion, throat and ear pain, body aches Fever 101 started Saturday.)      HPI  Presents to the clinci with c/o congestion, sore that and ear pain.  Symptoms since Friday.  Saturday started with fever.  Is afebrile today in clinic.  Went to urgent care and was negative for influenza and covid - want to be restested.   Marj's allergies, medications, history, and problem list were updated as appropriate.    Review of Systems   A comprehensive review of symptoms was completed and negative except as noted above.    Recent Results (from the past 3 weeks)   POCT Influenza A/B Molecular    Collection Time: 11/05/24  9:02 AM   Result Value Ref Range    POC Molecular Influenza A Ag Negative Negative    POC Molecular Influenza B Ag Negative Negative     Acceptable Yes    SARS Coronavirus 2 Antigen, POCT Manual Read    Collection Time: 11/05/24  9:02 AM   Result Value Ref Range    SARS Coronavirus 2 Antigen Negative Negative     Acceptable Yes        OBJECTIVE:  Vital signs  Vitals:    11/05/24 0824   BP: 122/68   BP Location: Right arm   Pulse: 89   Temp: 97.9 °F (36.6 °C)   TempSrc: Temporal   SpO2: 97%   Weight: 83.2 kg (183 lb 6.4 oz)   Height: 5' 2.6" (1.59 m)        Physical Exam  Constitutional:       Appearance: Normal appearance.   HENT:      Head: Normocephalic and atraumatic.      Right Ear: Tympanic membrane, ear canal and external ear normal.      Left Ear: Ear canal and external ear normal.      Ears:      Comments: Left TM red     Nose: Congestion present.      Mouth/Throat:      Mouth: Mucous membranes are moist.      Pharynx: Oropharynx is clear. Posterior oropharyngeal erythema present.   Eyes:      Conjunctiva/sclera: Conjunctivae normal.   Cardiovascular:      Rate and Rhythm: Normal rate and regular rhythm.   Pulmonary:      Effort: Pulmonary effort is normal.      Breath sounds: Normal breath sounds. "   Abdominal:      General: Bowel sounds are normal.      Palpations: Abdomen is soft.   Skin:     General: Skin is warm.      Capillary Refill: Capillary refill takes less than 2 seconds.   Neurological:      Mental Status: She is alert.          ASSESSMENT/PLAN:  1. Non-recurrent acute suppurative otitis media of left ear without spontaneous rupture of tympanic membrane  Comments:  dizziness likely from otitis, complete all antibiotics  Orders:  -     cefdinir (OMNICEF) 300 MG capsule; Take 1 capsule (300 mg total) by mouth 2 (two) times daily. for 10 days  Dispense: 20 capsule; Refill: 0    2. Fever, unspecified fever cause  Comments:  increase fluids, tyelnol/ibuprofen prn fever/pain  Orders:  -     POCT Influenza A/B Molecular  -     SARS Coronavirus 2 Antigen, POCT Manual Read        Follow Up:  Follow up if symptoms worsen or fail to improve.

## 2024-11-05 NOTE — PROGRESS NOTES
"SUBJECTIVE:  Marj Monroy is a 54 y.o. female here for Cough (Cough congestion, throat and ear pain, body aches Fever 101 started Saturday.)      HPI  ***  Marj's allergies, medications, history, and problem list were updated as appropriate.    Review of Systems   A comprehensive review of symptoms was completed and negative except as noted above.    No results found for this or any previous visit (from the past 3 weeks).    OBJECTIVE:  Vital signs  Vitals:    11/05/24 0824   BP: 122/68   BP Location: Right arm   Pulse: 89   Temp: 97.9 °F (36.6 °C)   TempSrc: Temporal   SpO2: 97%   Weight: 83.2 kg (183 lb 6.4 oz)   Height: 5' 2.6" (1.59 m)        Physical Exam     ASSESSMENT/PLAN:  {There are no diagnoses linked to this encounter. (Refresh or delete this SmartLink)}    Follow Up:  No follow-ups on file.    {Optional documentation below for documenting time spent for a visit to justify LOS. (This text will automatically delete.) :53844}{Time Based Documentation (Optional):01931}        "

## 2024-11-19 ENCOUNTER — OFFICE VISIT (OUTPATIENT)
Dept: FAMILY MEDICINE | Facility: CLINIC | Age: 54
End: 2024-11-19
Payer: COMMERCIAL

## 2024-11-19 VITALS
TEMPERATURE: 98 F | SYSTOLIC BLOOD PRESSURE: 122 MMHG | HEIGHT: 63 IN | WEIGHT: 183 LBS | OXYGEN SATURATION: 95 % | BODY MASS INDEX: 32.43 KG/M2 | DIASTOLIC BLOOD PRESSURE: 76 MMHG | HEART RATE: 79 BPM

## 2024-11-19 DIAGNOSIS — F98.8 ADD (ATTENTION DEFICIT DISORDER) WITHOUT HYPERACTIVITY: ICD-10-CM

## 2024-11-19 DIAGNOSIS — L21.9 SEBORRHEIC DERMATITIS: Primary | ICD-10-CM

## 2024-11-19 PROCEDURE — 3078F DIAST BP <80 MM HG: CPT | Mod: CPTII,,, | Performed by: FAMILY MEDICINE

## 2024-11-19 PROCEDURE — 3008F BODY MASS INDEX DOCD: CPT | Mod: CPTII,,, | Performed by: FAMILY MEDICINE

## 2024-11-19 PROCEDURE — 3074F SYST BP LT 130 MM HG: CPT | Mod: CPTII,,, | Performed by: FAMILY MEDICINE

## 2024-11-19 PROCEDURE — 1160F RVW MEDS BY RX/DR IN RCRD: CPT | Mod: CPTII,,, | Performed by: FAMILY MEDICINE

## 2024-11-19 PROCEDURE — 1159F MED LIST DOCD IN RCRD: CPT | Mod: CPTII,,, | Performed by: FAMILY MEDICINE

## 2024-11-19 PROCEDURE — 3044F HG A1C LEVEL LT 7.0%: CPT | Mod: CPTII,,, | Performed by: FAMILY MEDICINE

## 2024-11-19 PROCEDURE — 99213 OFFICE O/P EST LOW 20 MIN: CPT | Mod: ,,, | Performed by: FAMILY MEDICINE

## 2024-11-19 RX ORDER — METHYLPHENIDATE HYDROCHLORIDE 18 MG/1
18 TABLET ORAL EVERY MORNING
Qty: 30 TABLET | Refills: 0 | Status: SHIPPED | OUTPATIENT
Start: 2024-11-19 | End: 2024-12-19

## 2024-11-19 NOTE — PROGRESS NOTES
"SUBJECTIVE:  HPI    Marj Monroy is a 54 y.o. female here for Follow-up (3 WEEK - [place], ADD).     Seborrhea resolved with ciclopirox    ADD has been very well-controlled.  Her symptoms have dramatically improved.  Her work has been more focused, complete, thorough.  He has been more organized.  She initially had some headaches with methylphenidate but those resolved after 3 days.  No depressed mood.    Marj's allergies, medications, history, and problem list were updated as appropriate.    ROS:  Pertinent ROS as above, otherwise negative    OBJECTIVE:  Vital signs  Visit Vitals  /76 (BP Location: Right arm, Patient Position: Sitting)   Pulse 79   Temp 97.5 °F (36.4 °C) (Temporal)   Ht 5' 2.6" (1.59 m)   Wt 83 kg (183 lb)   LMP  (LMP Unknown)   SpO2 95%   BMI 32.83 kg/m²          PHYSICAL EXAM:  General: Awake, alert, no acute distress  Psychiatric: Mood and affect are normal.  Judgment and insight are intact.  Skin: Clear      ASSESSMENT/PLAN:  1. Seborrheic dermatitis  Assessment & Plan:  Resolved with ciclopirox      2. ADD (attention deficit disorder) without hyperactivity  Assessment & Plan:  Improved with methylphenidate extended release 18 mg daily    Orders:  -     methylphenidate HCl 18 MG CR tablet; Take 1 tablet (18 mg total) by mouth every morning.  Dispense: 30 tablet; Refill: 0      Follow Up:  Follow up in about 3 months (around 2/19/2025) for chronic health conditions.          "

## 2024-12-06 ENCOUNTER — DOCUMENTATION ONLY (OUTPATIENT)
Facility: CLINIC | Age: 54
End: 2024-12-06
Payer: COMMERCIAL

## 2024-12-26 ENCOUNTER — TELEPHONE (OUTPATIENT)
Dept: BEHAVIORAL HEALTH | Facility: CLINIC | Age: 54
End: 2024-12-26
Payer: COMMERCIAL

## 2024-12-26 DIAGNOSIS — F98.8 ADD (ATTENTION DEFICIT DISORDER) WITHOUT HYPERACTIVITY: ICD-10-CM

## 2024-12-26 RX ORDER — METHYLPHENIDATE HYDROCHLORIDE 18 MG/1
18 TABLET ORAL EVERY MORNING
Qty: 30 TABLET | Refills: 0 | Status: SHIPPED | OUTPATIENT
Start: 2024-12-26 | End: 2025-01-25

## 2025-01-24 DIAGNOSIS — F98.8 ADD (ATTENTION DEFICIT DISORDER) WITHOUT HYPERACTIVITY: ICD-10-CM

## 2025-01-24 DIAGNOSIS — M47.812 SPONDYLOSIS OF CERVICAL SPINE: Primary | ICD-10-CM

## 2025-01-24 RX ORDER — HYDROCODONE BITARTRATE AND ACETAMINOPHEN 5; 325 MG/1; MG/1
1 TABLET ORAL EVERY 6 HOURS PRN
Qty: 12 TABLET | Refills: 0 | Status: SHIPPED | OUTPATIENT
Start: 2025-01-24

## 2025-01-24 RX ORDER — METHOCARBAMOL 500 MG/1
500 TABLET, FILM COATED ORAL 3 TIMES DAILY PRN
Qty: 30 TABLET | Refills: 0 | Status: SHIPPED | OUTPATIENT
Start: 2025-01-24 | End: 2025-02-03

## 2025-01-24 RX ORDER — METHYLPHENIDATE HYDROCHLORIDE 18 MG/1
18 TABLET ORAL EVERY MORNING
Qty: 30 TABLET | Refills: 0 | Status: SHIPPED | OUTPATIENT
Start: 2025-01-24 | End: 2025-01-30 | Stop reason: SDUPTHER

## 2025-01-24 NOTE — TELEPHONE ENCOUNTER
Methylphenidate 18mg, QAM   Methocarbamol 500mg, QD   Norco 5mg, Q6H        Pt states that she is in a flare up with her neck. States that she is still seeing the chiropractor as well.         NANCY FELIX

## 2025-01-30 DIAGNOSIS — F98.8 ADD (ATTENTION DEFICIT DISORDER) WITHOUT HYPERACTIVITY: ICD-10-CM

## 2025-01-30 RX ORDER — METHYLPHENIDATE HYDROCHLORIDE 18 MG/1
18 TABLET ORAL EVERY MORNING
Qty: 30 TABLET | Refills: 0 | Status: SHIPPED | OUTPATIENT
Start: 2025-01-30 | End: 2025-03-01

## 2025-01-30 NOTE — TELEPHONE ENCOUNTER
Pt states that the pharmacy never got the methylphenidate rx from 1/24/25. States that she is leaving within the next hour to go out of town.     NANCY FELIX

## 2025-02-03 ENCOUNTER — OFFICE VISIT (OUTPATIENT)
Dept: FAMILY MEDICINE | Facility: CLINIC | Age: 55
End: 2025-02-03
Payer: COMMERCIAL

## 2025-02-03 VITALS
HEIGHT: 63 IN | TEMPERATURE: 99 F | BODY MASS INDEX: 31.39 KG/M2 | DIASTOLIC BLOOD PRESSURE: 72 MMHG | OXYGEN SATURATION: 96 % | SYSTOLIC BLOOD PRESSURE: 118 MMHG | WEIGHT: 177.19 LBS | HEART RATE: 104 BPM

## 2025-02-03 DIAGNOSIS — M47.812 SPONDYLOSIS OF CERVICAL SPINE: ICD-10-CM

## 2025-02-03 DIAGNOSIS — E03.9 ACQUIRED HYPOTHYROIDISM: ICD-10-CM

## 2025-02-03 DIAGNOSIS — F98.8 ADD (ATTENTION DEFICIT DISORDER) WITHOUT HYPERACTIVITY: Primary | ICD-10-CM

## 2025-02-03 DIAGNOSIS — F33.41 RECURRENT MAJOR DEPRESSIVE DISORDER, IN PARTIAL REMISSION: ICD-10-CM

## 2025-02-03 DIAGNOSIS — E78.2 MIXED HYPERLIPIDEMIA: ICD-10-CM

## 2025-02-03 PROCEDURE — 1160F RVW MEDS BY RX/DR IN RCRD: CPT | Mod: CPTII,,, | Performed by: FAMILY MEDICINE

## 2025-02-03 PROCEDURE — 99214 OFFICE O/P EST MOD 30 MIN: CPT | Mod: ,,, | Performed by: FAMILY MEDICINE

## 2025-02-03 PROCEDURE — 3078F DIAST BP <80 MM HG: CPT | Mod: CPTII,,, | Performed by: FAMILY MEDICINE

## 2025-02-03 PROCEDURE — 3008F BODY MASS INDEX DOCD: CPT | Mod: CPTII,,, | Performed by: FAMILY MEDICINE

## 2025-02-03 PROCEDURE — 1159F MED LIST DOCD IN RCRD: CPT | Mod: CPTII,,, | Performed by: FAMILY MEDICINE

## 2025-02-03 PROCEDURE — 3074F SYST BP LT 130 MM HG: CPT | Mod: CPTII,,, | Performed by: FAMILY MEDICINE

## 2025-02-03 RX ORDER — METFORMIN HYDROCHLORIDE 500 MG/1
500 TABLET, EXTENDED RELEASE ORAL EVERY MORNING
COMMUNITY
Start: 2025-01-09 | End: 2025-03-03 | Stop reason: SDUPTHER

## 2025-02-03 RX ORDER — LEVOTHYROXINE SODIUM 100 UG/1
100 TABLET ORAL EVERY MORNING
Qty: 90 TABLET | Refills: 3 | Status: SHIPPED | OUTPATIENT
Start: 2025-02-03 | End: 2025-03-01 | Stop reason: SDUPTHER

## 2025-02-03 RX ORDER — LIOTHYRONINE SODIUM 5 UG/1
10 TABLET ORAL DAILY
Qty: 180 TABLET | Refills: 3 | Status: SHIPPED | OUTPATIENT
Start: 2025-02-03 | End: 2026-02-03

## 2025-02-03 RX ORDER — FENOFIBRATE 160 MG/1
160 TABLET ORAL DAILY
Qty: 90 TABLET | Refills: 3 | Status: SHIPPED | OUTPATIENT
Start: 2025-02-03 | End: 2026-02-03

## 2025-02-03 NOTE — ASSESSMENT & PLAN NOTE
Home stretching     P.r.n. hydrocodone and Robaxin when flared up    Ibuprofen 600 mg every 8 hours as needed

## 2025-02-03 NOTE — PROGRESS NOTES
"SUBJECTIVE:  HPI    Marj Monroy is a 54 y.o. female here for Follow-up (Medication).   History of Present Illness    CHIEF COMPLAINT:  Patient presents today to discuss medication management for ADHD and depression    MEDICATIONS:  She is currently taking Concerta, Vyvanse, Methylphenidate, Fenofibrate (requires refill), and Levothyroxine (has remaining refills). 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            Marj's allergies, medications, history, and problem list were updated as appropriate.    ROS:  Pertinent ROS as above, otherwise negative    OBJECTIVE:  Vital signs  Visit Vitals  /72 (BP Location: Left arm, Patient Position: Sitting)   Pulse 104   Temp 98.5 °F (36.9 °C) (Temporal)   Ht 5' 2.6" (1.59 m)   Wt 80.4 kg (177 lb 3.2 oz)   LMP  (LMP Unknown)   SpO2 96%   BMI 31.79 kg/m²          PHYSICAL EXAM:  General: Awake, alert, no acute distress  Psychiatric: Mood and affect are appropriate.  Judgment and insight are intact.  No homicidal or suicidal ideation.    ASSESSMENT/PLAN:  1. ADD (attention deficit disorder) without hyperactivity  Assessment & Plan:  Improved    Methylphenidate extended release 18 mg daily      2. Recurrent major depressive disorder, in partial remission  Overview:  Tried and failed the following drugs in the past:  Celexa, Lexapro, Zoloft, Trintellix, Cymbalta, Paxil    Assessment & Plan:  Viibryd 20 mg daily      3. Spondylosis of cervical spine  Overview:  July 18, 2024 x-rays of the cervical spine:   Postoperative changes are present compatible with a previous discectomy and anterior fusion at the C4 through C7 levels.  There is moderate disc space narrowing with moderate vertebral body, uncovertebral and facet joint spurring at the C3-C4 level.       Assessment & Plan:  Home stretching " "    P.r.n. hydrocodone and Robaxin when flared up    Ibuprofen 600 mg every 8 hours as needed        4. Acquired hypothyroidism  Overview:  Lab Results   Component Value Date    TSH 1.220 09/09/2024           Assessment & Plan:  Levothyroxine 100 mcg daily    Cytomel 10 mcg daily    Orders:  -     levothyroxine (SYNTHROID) 100 MCG tablet; Take 1 tablet (100 mcg total) by mouth every morning.  Dispense: 90 tablet; Refill: 3  -     CYTOMEL 5 mcg Tab; Take 2 tablets (10 mcg total) by mouth once daily.  Dispense: 180 tablet; Refill: 3    5. Mixed hyperlipidemia  Overview:  Lab Results   Component Value Date    CHOL 184 09/09/2024    CHOL 172 04/05/2024    CHOL 186 06/23/2021     Lab Results   Component Value Date    HDL 48 09/09/2024    HDL 54 04/05/2024    HDL 38 (L) 06/23/2021     Lab Results   Component Value Date    LDLCALC 102 (H) 09/09/2024    LDLCALC 100 (H) 04/05/2024     Lab Results   Component Value Date    TRIG 197 (H) 09/09/2024    TRIG 101 04/05/2024    TRIG 150 06/23/2021       No results found for: "CHOLHDL"      Assessment & Plan:  Fenofibrate 160 mg daily with goal triglycerides less than 150 and goal LDL less than 100    Orders:  -     fenofibrate 160 MG Tab; Take 1 tablet (160 mg total) by mouth once daily.  Dispense: 90 tablet; Refill: 3      Follow Up:  Follow up in about 3 months (around 5/3/2025) for chronic health conditions.        This note was generated with the assistance of ambient listening technology. Verbal consent was obtained by the patient and accompanying visitor(s) for the recording of patient appointment to facilitate this note. I attest to having reviewed and edited the generated note for accuracy, though some syntax or spelling errors may persist. Please contact the author of this note for any clarification.       "

## 2025-03-01 DIAGNOSIS — F98.8 ADD (ATTENTION DEFICIT DISORDER) WITHOUT HYPERACTIVITY: ICD-10-CM

## 2025-03-01 DIAGNOSIS — E03.9 ACQUIRED HYPOTHYROIDISM: ICD-10-CM

## 2025-03-03 ENCOUNTER — TELEPHONE (OUTPATIENT)
Dept: FAMILY MEDICINE | Facility: CLINIC | Age: 55
End: 2025-03-03
Payer: COMMERCIAL

## 2025-03-03 DIAGNOSIS — R73.01 IMPAIRED FASTING GLUCOSE: Primary | ICD-10-CM

## 2025-03-03 RX ORDER — LEVOTHYROXINE SODIUM 100 UG/1
100 TABLET ORAL EVERY MORNING
Qty: 90 TABLET | Refills: 3 | Status: SHIPPED | OUTPATIENT
Start: 2025-03-03 | End: 2026-03-03

## 2025-03-03 RX ORDER — METFORMIN HYDROCHLORIDE 500 MG/1
500 TABLET, EXTENDED RELEASE ORAL EVERY MORNING
Qty: 90 TABLET | Refills: 3 | Status: SHIPPED | OUTPATIENT
Start: 2025-03-03 | End: 2025-03-06 | Stop reason: SDUPTHER

## 2025-03-03 RX ORDER — METHYLPHENIDATE HYDROCHLORIDE 18 MG/1
18 TABLET ORAL EVERY MORNING
Qty: 30 TABLET | Refills: 0 | Status: SHIPPED | OUTPATIENT
Start: 2025-03-03 | End: 2025-04-02

## 2025-03-06 ENCOUNTER — PATIENT MESSAGE (OUTPATIENT)
Dept: FAMILY MEDICINE | Facility: CLINIC | Age: 55
End: 2025-03-06
Payer: COMMERCIAL

## 2025-03-06 DIAGNOSIS — R73.01 IMPAIRED FASTING GLUCOSE: ICD-10-CM

## 2025-03-06 RX ORDER — METFORMIN HYDROCHLORIDE 500 MG/1
500 TABLET, EXTENDED RELEASE ORAL EVERY MORNING
Qty: 90 TABLET | Refills: 3 | Status: SHIPPED | OUTPATIENT
Start: 2025-03-06

## 2025-03-07 DIAGNOSIS — R73.01 IMPAIRED FASTING GLUCOSE: ICD-10-CM

## 2025-03-07 DIAGNOSIS — F33.41 RECURRENT MAJOR DEPRESSIVE DISORDER, IN PARTIAL REMISSION: ICD-10-CM

## 2025-03-10 RX ORDER — VILAZODONE HYDROCHLORIDE 20 MG/1
20 TABLET ORAL DAILY
Qty: 90 TABLET | Refills: 3 | Status: SHIPPED | OUTPATIENT
Start: 2025-03-10

## 2025-03-10 RX ORDER — METFORMIN HYDROCHLORIDE 500 MG/1
500 TABLET, EXTENDED RELEASE ORAL 2 TIMES DAILY WITH MEALS
Qty: 180 TABLET | Refills: 3 | Status: SHIPPED | OUTPATIENT
Start: 2025-03-10

## 2025-03-24 ENCOUNTER — PATIENT OUTREACH (OUTPATIENT)
Facility: CLINIC | Age: 55
End: 2025-03-24
Payer: COMMERCIAL

## 2025-03-24 NOTE — PROGRESS NOTES
Health Maintenance Topic(s) Outreach Outcomes & Actions Taken:    Breast Cancer Screening - Outreach Outcomes & Actions Taken  : I attempted to contact patient. No answer. Left message.       Additional Notes:

## 2025-03-30 ENCOUNTER — PATIENT MESSAGE (OUTPATIENT)
Dept: FAMILY MEDICINE | Facility: CLINIC | Age: 55
End: 2025-03-30
Payer: COMMERCIAL

## 2025-03-30 DIAGNOSIS — F98.8 ADD (ATTENTION DEFICIT DISORDER) WITHOUT HYPERACTIVITY: ICD-10-CM

## 2025-03-31 RX ORDER — METHYLPHENIDATE HYDROCHLORIDE 18 MG/1
18 TABLET ORAL EVERY MORNING
Qty: 90 TABLET | Refills: 0 | Status: SHIPPED | OUTPATIENT
Start: 2025-03-31

## 2025-05-26 ENCOUNTER — PATIENT OUTREACH (OUTPATIENT)
Facility: CLINIC | Age: 55
End: 2025-05-26
Payer: COMMERCIAL

## 2025-06-02 ENCOUNTER — OFFICE VISIT (OUTPATIENT)
Dept: FAMILY MEDICINE | Facility: CLINIC | Age: 55
End: 2025-06-02
Payer: COMMERCIAL

## 2025-06-02 VITALS
HEART RATE: 88 BPM | TEMPERATURE: 98 F | WEIGHT: 164.63 LBS | OXYGEN SATURATION: 95 % | BODY MASS INDEX: 29.17 KG/M2 | SYSTOLIC BLOOD PRESSURE: 124 MMHG | DIASTOLIC BLOOD PRESSURE: 78 MMHG | HEIGHT: 63 IN

## 2025-06-02 DIAGNOSIS — F98.8 ADD (ATTENTION DEFICIT DISORDER) WITHOUT HYPERACTIVITY: Primary | ICD-10-CM

## 2025-06-02 DIAGNOSIS — E03.9 ACQUIRED HYPOTHYROIDISM: ICD-10-CM

## 2025-06-02 DIAGNOSIS — R73.01 IMPAIRED FASTING GLUCOSE: ICD-10-CM

## 2025-06-02 DIAGNOSIS — F33.42 RECURRENT MAJOR DEPRESSIVE DISORDER, IN FULL REMISSION: ICD-10-CM

## 2025-06-02 DIAGNOSIS — K75.81 NONALCOHOLIC STEATOHEPATITIS (NASH): ICD-10-CM

## 2025-06-02 DIAGNOSIS — E78.2 MIXED HYPERLIPIDEMIA: ICD-10-CM

## 2025-06-02 PROCEDURE — 1159F MED LIST DOCD IN RCRD: CPT | Mod: CPTII,,, | Performed by: FAMILY MEDICINE

## 2025-06-02 PROCEDURE — 3078F DIAST BP <80 MM HG: CPT | Mod: CPTII,,, | Performed by: FAMILY MEDICINE

## 2025-06-02 PROCEDURE — 3074F SYST BP LT 130 MM HG: CPT | Mod: CPTII,,, | Performed by: FAMILY MEDICINE

## 2025-06-02 PROCEDURE — 3008F BODY MASS INDEX DOCD: CPT | Mod: CPTII,,, | Performed by: FAMILY MEDICINE

## 2025-06-02 PROCEDURE — 1160F RVW MEDS BY RX/DR IN RCRD: CPT | Mod: CPTII,,, | Performed by: FAMILY MEDICINE

## 2025-06-02 PROCEDURE — 99214 OFFICE O/P EST MOD 30 MIN: CPT | Mod: ,,, | Performed by: FAMILY MEDICINE
